# Patient Record
Sex: MALE | Race: WHITE | NOT HISPANIC OR LATINO | Employment: OTHER | RURAL
[De-identification: names, ages, dates, MRNs, and addresses within clinical notes are randomized per-mention and may not be internally consistent; named-entity substitution may affect disease eponyms.]

---

## 2018-01-03 ENCOUNTER — CONVERSION ENCOUNTER (OUTPATIENT)
Dept: FAMILY MEDICINE CLINIC | Facility: CLINIC | Age: 48
End: 2018-01-03

## 2018-01-03 LAB
ALBUMIN SERPL-MCNC: 4.1 G/DL (ref 3.6–5.1)
ALP SERPL-CCNC: 50 U/L (ref 40–115)
AST SERPL-CCNC: 33 U/L (ref 10–40)
BILIRUB SERPL-MCNC: 0.9 MG/DL (ref 0.2–1.2)
BUN SERPL-MCNC: 9 MG/DL (ref 7–25)
BUN/CREAT SERPL: 9 (CALC) (ref 6–22)
CALCIUM SERPL-MCNC: 9.8 MG/DL (ref 8.6–10.2)
CHLORIDE SERPL-SCNC: 102 MMOL/L (ref 98–110)
CHOLEST SERPL-MCNC: 177 MG/DL (ref 125–200)
CONV CO2: 28 MMOL/L (ref 21–33)
CONV TOTAL PROTEIN: 7.1 G/DL (ref 6.2–8.3)
CREAT UR-MCNC: 1 MG/DL (ref 0.78–1.34)
GLOBULIN UR ELPH-MCNC: 3 MG/DL (ref 2.1–3.7)
GLUCOSE UR QL: 98 MG/DL (ref 65–99)
HDLC SERPL-MCNC: 29 MG/DL
INSULIN SERPL-ACNC: 1.4 (CALC) (ref 1–2.1)
POTASSIUM SERPL-SCNC: 3.8 MMOL/L (ref 3.5–5.3)
SODIUM SERPL-SCNC: 139 MMOL/L (ref 135–146)
TRIGL SERPL-MCNC: 408 MG/DL

## 2018-02-13 ENCOUNTER — INPATIENT HOSPITAL (OUTPATIENT)
Dept: URBAN - METROPOLITAN AREA HOSPITAL 84 | Facility: HOSPITAL | Age: 48
End: 2018-02-13

## 2018-02-13 DIAGNOSIS — K85.90 ACUTE PANCREATITIS WITHOUT NECROSIS OR INFECTION, UNSPECIFIE: ICD-10-CM

## 2018-02-13 PROCEDURE — 99222 1ST HOSP IP/OBS MODERATE 55: CPT | Performed by: NURSE PRACTITIONER

## 2018-02-14 ENCOUNTER — INPATIENT HOSPITAL (OUTPATIENT)
Dept: URBAN - METROPOLITAN AREA HOSPITAL 84 | Facility: HOSPITAL | Age: 48
End: 2018-02-14

## 2018-02-14 DIAGNOSIS — K85.90 ACUTE PANCREATITIS WITHOUT NECROSIS OR INFECTION, UNSPECIFIE: ICD-10-CM

## 2018-02-14 PROCEDURE — 99232 SBSQ HOSP IP/OBS MODERATE 35: CPT | Performed by: NURSE PRACTITIONER

## 2018-02-15 ENCOUNTER — INPATIENT HOSPITAL (OUTPATIENT)
Dept: URBAN - METROPOLITAN AREA HOSPITAL 84 | Facility: HOSPITAL | Age: 48
End: 2018-02-15

## 2018-02-15 DIAGNOSIS — K85.90 ACUTE PANCREATITIS WITHOUT NECROSIS OR INFECTION, UNSPECIFIE: ICD-10-CM

## 2018-02-15 PROCEDURE — 99232 SBSQ HOSP IP/OBS MODERATE 35: CPT | Performed by: NURSE PRACTITIONER

## 2018-02-16 ENCOUNTER — INPATIENT HOSPITAL (OUTPATIENT)
Dept: URBAN - METROPOLITAN AREA HOSPITAL 84 | Facility: HOSPITAL | Age: 48
End: 2018-02-16

## 2018-02-16 DIAGNOSIS — K85.90 ACUTE PANCREATITIS WITHOUT NECROSIS OR INFECTION, UNSPECIFIE: ICD-10-CM

## 2018-02-16 PROCEDURE — 99232 SBSQ HOSP IP/OBS MODERATE 35: CPT | Performed by: NURSE PRACTITIONER

## 2018-02-17 ENCOUNTER — INPATIENT HOSPITAL (OUTPATIENT)
Dept: URBAN - METROPOLITAN AREA HOSPITAL 84 | Facility: HOSPITAL | Age: 48
End: 2018-02-17

## 2018-02-17 DIAGNOSIS — K85.90 ACUTE PANCREATITIS WITHOUT NECROSIS OR INFECTION, UNSPECIFIE: ICD-10-CM

## 2018-02-17 PROCEDURE — 99231 SBSQ HOSP IP/OBS SF/LOW 25: CPT | Performed by: NURSE PRACTITIONER

## 2019-07-17 DIAGNOSIS — E78.5 HYPERLIPIDEMIA, UNSPECIFIED HYPERLIPIDEMIA TYPE: Primary | ICD-10-CM

## 2019-07-17 DIAGNOSIS — I10 HYPERTENSION, UNSPECIFIED TYPE: ICD-10-CM

## 2019-07-19 LAB
ALBUMIN SERPL-MCNC: 4.8 G/DL (ref 3.5–5.5)
ALBUMIN/GLOB SERPL: 1.7 {RATIO} (ref 1.2–2.2)
ALP SERPL-CCNC: 74 IU/L (ref 39–117)
ALT SERPL-CCNC: 52 IU/L (ref 0–44)
AST SERPL-CCNC: 39 IU/L (ref 0–40)
BASOPHILS # BLD AUTO: 0 X10E3/UL (ref 0–0.2)
BASOPHILS NFR BLD AUTO: 0 %
BILIRUB SERPL-MCNC: 0.5 MG/DL (ref 0–1.2)
BUN SERPL-MCNC: 16 MG/DL (ref 6–24)
BUN/CREAT SERPL: 16 (ref 9–20)
CALCIUM SERPL-MCNC: 9.8 MG/DL (ref 8.7–10.2)
CHLORIDE SERPL-SCNC: 101 MMOL/L (ref 96–106)
CHOLEST SERPL-MCNC: 223 MG/DL (ref 100–199)
CHOLEST/HDLC SERPL: 7.7 RATIO (ref 0–5)
CO2 SERPL-SCNC: 24 MMOL/L (ref 20–29)
CREAT SERPL-MCNC: 0.98 MG/DL (ref 0.76–1.27)
EOSINOPHIL # BLD AUTO: 0.2 X10E3/UL (ref 0–0.4)
EOSINOPHIL NFR BLD AUTO: 2 %
ERYTHROCYTE [DISTWIDTH] IN BLOOD BY AUTOMATED COUNT: 13.6 % (ref 12.3–15.4)
GLOBULIN SER CALC-MCNC: 2.9 G/DL (ref 1.5–4.5)
GLUCOSE SERPL-MCNC: 94 MG/DL (ref 65–99)
HCT VFR BLD AUTO: 52 % (ref 37.5–51)
HDLC SERPL-MCNC: 29 MG/DL
HGB BLD-MCNC: 17.6 G/DL (ref 13–17.7)
IMM GRANULOCYTES # BLD AUTO: 0 X10E3/UL (ref 0–0.1)
IMM GRANULOCYTES NFR BLD AUTO: 0 %
LDLC SERPL CALC-MCNC: 118 MG/DL (ref 0–99)
LYMPHOCYTES # BLD AUTO: 3.4 X10E3/UL (ref 0.7–3.1)
LYMPHOCYTES NFR BLD AUTO: 37 %
MCH RBC QN AUTO: 29.8 PG (ref 26.6–33)
MCHC RBC AUTO-ENTMCNC: 33.8 G/DL (ref 31.5–35.7)
MCV RBC AUTO: 88 FL (ref 79–97)
MONOCYTES # BLD AUTO: 0.5 X10E3/UL (ref 0.1–0.9)
MONOCYTES NFR BLD AUTO: 6 %
NEUTROPHILS # BLD AUTO: 4.9 X10E3/UL (ref 1.4–7)
NEUTROPHILS NFR BLD AUTO: 55 %
PLATELET # BLD AUTO: 234 X10E3/UL (ref 150–450)
POTASSIUM SERPL-SCNC: 4.1 MMOL/L (ref 3.5–5.2)
PROT SERPL-MCNC: 7.7 G/DL (ref 6–8.5)
RBC # BLD AUTO: 5.9 X10E6/UL (ref 4.14–5.8)
SODIUM SERPL-SCNC: 142 MMOL/L (ref 134–144)
TRIGL SERPL-MCNC: 379 MG/DL (ref 0–149)
TSH SERPL DL<=0.005 MIU/L-ACNC: 3.54 UIU/ML (ref 0.45–4.5)
VLDLC SERPL CALC-MCNC: 76 MG/DL (ref 5–40)
WBC # BLD AUTO: 9.2 X10E3/UL (ref 3.4–10.8)

## 2019-07-23 PROBLEM — M10.9 GOUT: Status: ACTIVE | Noted: 2019-07-23

## 2019-07-23 PROBLEM — E78.5 HYPERLIPIDEMIA: Status: ACTIVE | Noted: 2019-07-23

## 2019-07-23 PROBLEM — I10 HYPERTENSION, BENIGN: Status: ACTIVE | Noted: 2019-07-23

## 2019-07-23 PROBLEM — J30.1 SEASONAL ALLERGIC RHINITIS DUE TO POLLEN: Status: ACTIVE | Noted: 2019-07-23

## 2019-07-23 PROBLEM — E66.9 OBESITY: Status: ACTIVE | Noted: 2019-07-23

## 2019-07-23 PROBLEM — Z00.01 ANNUAL VISIT FOR GENERAL ADULT MEDICAL EXAMINATION WITH ABNORMAL FINDINGS: Status: ACTIVE | Noted: 2019-07-23

## 2019-07-23 PROBLEM — F51.9 NONORGANIC SLEEP DISORDER: Status: ACTIVE | Noted: 2019-07-23

## 2019-07-23 PROBLEM — G47.33 OSA (OBSTRUCTIVE SLEEP APNEA): Status: ACTIVE | Noted: 2019-07-23

## 2019-07-23 PROBLEM — E66.3 OVERWEIGHT: Status: ACTIVE | Noted: 2019-07-23

## 2019-07-23 PROBLEM — R51.9 HEADACHE: Status: ACTIVE | Noted: 2019-07-23

## 2019-07-23 PROBLEM — Z13.1 SCREENING FOR DIABETES MELLITUS: Status: ACTIVE | Noted: 2019-07-23

## 2019-07-23 PROBLEM — F43.0 ACUTE CRISIS REACTION: Status: ACTIVE | Noted: 2019-07-23

## 2019-07-23 PROBLEM — R53.83 MALAISE AND FATIGUE: Status: ACTIVE | Noted: 2019-07-23

## 2019-07-23 PROBLEM — R53.81 MALAISE AND FATIGUE: Status: ACTIVE | Noted: 2019-07-23

## 2019-07-23 PROBLEM — R07.9 CHEST PAIN: Status: ACTIVE | Noted: 2019-07-23

## 2019-07-23 PROBLEM — K64.9 HEMORRHOIDS: Status: ACTIVE | Noted: 2019-07-23

## 2019-07-23 RX ORDER — FENOFIBRATE 145 MG/1
1 TABLET, COATED ORAL DAILY
COMMUNITY
Start: 2018-01-10 | End: 2019-07-24 | Stop reason: SDUPTHER

## 2019-07-23 RX ORDER — FLUTICASONE PROPIONATE 50 MCG
2 SPRAY, SUSPENSION (ML) NASAL DAILY
COMMUNITY
Start: 2018-01-10 | End: 2021-06-14 | Stop reason: SDUPTHER

## 2019-07-23 RX ORDER — HYDROCHLOROTHIAZIDE 25 MG/1
1 TABLET ORAL DAILY
COMMUNITY
Start: 2018-01-10 | End: 2019-07-24 | Stop reason: ALTCHOICE

## 2019-07-23 RX ORDER — ATORVASTATIN CALCIUM 10 MG/1
1 TABLET, FILM COATED ORAL EVERY OTHER DAY
COMMUNITY
Start: 2018-07-31 | End: 2019-07-24 | Stop reason: SDUPTHER

## 2019-07-23 RX ORDER — CETIRIZINE HYDROCHLORIDE 10 MG/1
TABLET ORAL DAILY
COMMUNITY
Start: 2018-01-10 | End: 2019-12-02

## 2019-07-23 RX ORDER — MONTELUKAST SODIUM 10 MG/1
1 TABLET ORAL DAILY
COMMUNITY
Start: 2018-01-10 | End: 2019-12-02

## 2019-07-23 RX ORDER — LISINOPRIL 40 MG/1
1 TABLET ORAL DAILY
COMMUNITY
Start: 2018-01-10 | End: 2019-07-24 | Stop reason: SDUPTHER

## 2019-07-23 RX ORDER — ALLOPURINOL 300 MG/1
1 TABLET ORAL DAILY
COMMUNITY
Start: 2018-01-10 | End: 2019-07-24 | Stop reason: SDUPTHER

## 2019-07-24 ENCOUNTER — OFFICE VISIT (OUTPATIENT)
Dept: FAMILY MEDICINE CLINIC | Facility: CLINIC | Age: 49
End: 2019-07-24

## 2019-07-24 VITALS
DIASTOLIC BLOOD PRESSURE: 130 MMHG | TEMPERATURE: 98.3 F | SYSTOLIC BLOOD PRESSURE: 190 MMHG | RESPIRATION RATE: 20 BRPM | OXYGEN SATURATION: 98 % | HEART RATE: 99 BPM | WEIGHT: 236.4 LBS | HEIGHT: 70 IN | BODY MASS INDEX: 33.84 KG/M2

## 2019-07-24 DIAGNOSIS — E78.5 HYPERLIPIDEMIA, UNSPECIFIED HYPERLIPIDEMIA TYPE: ICD-10-CM

## 2019-07-24 DIAGNOSIS — J30.1 SEASONAL ALLERGIC RHINITIS DUE TO POLLEN: ICD-10-CM

## 2019-07-24 DIAGNOSIS — G47.33 OSA (OBSTRUCTIVE SLEEP APNEA): ICD-10-CM

## 2019-07-24 DIAGNOSIS — I10 HYPERTENSION, BENIGN: ICD-10-CM

## 2019-07-24 DIAGNOSIS — M10.9 GOUT, UNSPECIFIED CAUSE, UNSPECIFIED CHRONICITY, UNSPECIFIED SITE: Primary | ICD-10-CM

## 2019-07-24 DIAGNOSIS — Z00.01 ANNUAL VISIT FOR GENERAL ADULT MEDICAL EXAMINATION WITH ABNORMAL FINDINGS: ICD-10-CM

## 2019-07-24 LAB
BILIRUB BLD-MCNC: NEGATIVE MG/DL
CLARITY, POC: CLEAR
COLOR UR: YELLOW
GLUCOSE UR STRIP-MCNC: NEGATIVE MG/DL
KETONES UR QL: NEGATIVE
LEUKOCYTE EST, POC: NEGATIVE
NITRITE UR-MCNC: NEGATIVE MG/ML
PH UR: 5 [PH] (ref 5–8)
PROT UR STRIP-MCNC: ABNORMAL MG/DL
RBC # UR STRIP: ABNORMAL /UL
SP GR UR: 1.01 (ref 1–1.03)
UROBILINOGEN UR QL: NORMAL

## 2019-07-24 PROCEDURE — 81003 URINALYSIS AUTO W/O SCOPE: CPT | Performed by: FAMILY MEDICINE

## 2019-07-24 PROCEDURE — 99396 PREV VISIT EST AGE 40-64: CPT | Performed by: FAMILY MEDICINE

## 2019-07-24 PROCEDURE — 99213 OFFICE O/P EST LOW 20 MIN: CPT | Performed by: FAMILY MEDICINE

## 2019-07-24 RX ORDER — FENOFIBRATE 145 MG/1
145 TABLET, COATED ORAL DAILY
Qty: 90 TABLET | Refills: 3 | Status: SHIPPED | OUTPATIENT
Start: 2019-07-24 | End: 2020-08-05

## 2019-07-24 RX ORDER — LISINOPRIL 40 MG/1
40 TABLET ORAL DAILY
Qty: 90 TABLET | Refills: 3 | Status: SHIPPED | OUTPATIENT
Start: 2019-07-24 | End: 2020-08-05

## 2019-07-24 RX ORDER — ATORVASTATIN CALCIUM 10 MG/1
10 TABLET, FILM COATED ORAL DAILY
Qty: 90 TABLET | Refills: 3 | Status: SHIPPED | OUTPATIENT
Start: 2019-07-24 | End: 2020-08-05

## 2019-07-24 RX ORDER — ALLOPURINOL 300 MG/1
300 TABLET ORAL DAILY
Qty: 90 TABLET | Refills: 3 | Status: SHIPPED | OUTPATIENT
Start: 2019-07-24 | End: 2020-08-05

## 2019-07-24 RX ORDER — GLUCOSAMINE HCL 500 MG
1 TABLET ORAL
COMMUNITY

## 2019-07-24 NOTE — PROGRESS NOTES
Bethanie Sam is a 49 y.o. male.     Chief Complaint   Patient presents with   • Annual Exam     last physical 10/4/2017       The patient is here: for coordination of medical care.  Last comprehensive physical was on 18 mos ago.  Previous physical was performed by  Dr. Tubbs  .  Overall has: moderate activity with work/home activities. Nutrition: inappropriate diet. Last tetanus shot was unknown. .    History of Present Illness     Recent Hospitalizations:  No  [facility]      I personally reviewed and updated the patient's allergies, medications, problem list, and past medical, surgical, social, and family history.     Family History   Problem Relation Age of Onset   • Heart disease Mother    • Hypertension Mother    • Stroke Mother    • Heart disease Father    • Lung cancer Paternal Uncle    • Gout Maternal Grandmother    • Hearing loss Maternal Grandfather    • Hypertension Maternal Grandfather        Social History     Tobacco Use   • Smoking status: Never Smoker   Substance Use Topics   • Alcohol use: No     Frequency: Never   • Drug use: No       History reviewed. No pertinent surgical history.    Patient Active Problem List   Diagnosis   • Acute crisis reaction   • Chest pain   • Gout   • Headache   • Hemorrhoids   • Hyperlipidemia   • Hypertension, benign   • Malaise and fatigue   • Nonorganic sleep disorder   • Obesity   • VERONICA (obstructive sleep apnea)   • Overweight   • Annual visit for general adult medical examination with abnormal findings   • Screening for diabetes mellitus   • Seasonal allergic rhinitis due to pollen         Current Outpatient Medications:   •  Cholecalciferol (VITAMIN D3) 3000 units tablet, Take 1 tablet by mouth., Disp: , Rfl:   •  allopurinol (ZYLOPRIM) 300 MG tablet, Take 1 tablet by mouth Daily., Disp: 90 tablet, Rfl: 3  •  atorvastatin (LIPITOR) 10 MG tablet, Take 1 tablet by mouth Daily., Disp: 90 tablet, Rfl: 3  •  cetirizine (ZYRTEC ALLERGY) 10 MG tablet, Take   "by mouth Daily., Disp: , Rfl:   •  fenofibrate (TRICOR) 145 MG tablet, Take 1 tablet by mouth Daily., Disp: 90 tablet, Rfl: 3  •  fluticasone (FLONASE) 50 MCG/ACT nasal spray, 2 sprays into the nostril(s) as directed by provider Daily., Disp: , Rfl:   •  lisinopril (PRINIVIL,ZESTRIL) 40 MG tablet, Take 1 tablet by mouth Daily., Disp: 90 tablet, Rfl: 3  •  montelukast (SINGULAIR) 10 MG tablet, Take 1 tablet by mouth Daily., Disp: , Rfl:          Review of Systems   Constitutional: Negative for chills and diaphoresis.   HENT: Negative for trouble swallowing and voice change.    Eyes: Negative for visual disturbance.   Respiratory: Negative for shortness of breath.    Cardiovascular: Negative for chest pain and palpitations.   Gastrointestinal: Negative for abdominal pain and nausea.   Endocrine: Negative for polydipsia and polyphagia.   Genitourinary: Negative for hematuria.   Musculoskeletal: Negative for neck stiffness.   Skin: Negative for color change and pallor.   Allergic/Immunologic: Negative for immunocompromised state.   Neurological: Negative for seizures and syncope.   Hematological: Negative for adenopathy.   Psychiatric/Behavioral: Negative for sleep disturbance and suicidal ideas.       Objective   BP (!) 190/130   Pulse 99   Temp 98.3 °F (36.8 °C) (Oral)   Resp 20   Ht 176.5 cm (69.5\")   Wt 107 kg (236 lb 6.4 oz)   SpO2 98%   BMI 34.41 kg/m²   Wt Readings from Last 3 Encounters:   07/24/19 107 kg (236 lb 6.4 oz)   01/10/18 103 kg (227 lb 12.8 oz)   10/04/17 101 kg (221 lb 12.8 oz)     Physical Exam   Constitutional: He is oriented to person, place, and time. He appears well-developed and well-nourished.   HENT:   Head: Normocephalic.   Right Ear: Tympanic membrane, external ear and ear canal normal.   Left Ear: Tympanic membrane, external ear and ear canal normal.   Nose: Nose normal.   Eyes: Conjunctivae, EOM and lids are normal. Pupils are equal, round, and reactive to light.   Neck: No JVD " present. Carotid bruit is not present. No tracheal deviation present. No thyromegaly present.   Cardiovascular: Normal rate, regular rhythm, normal heart sounds and intact distal pulses. Exam reveals no gallop and no friction rub.   No murmur heard.  Pulmonary/Chest: Effort normal and breath sounds normal. No stridor. He has no decreased breath sounds. He has no wheezes. He has no rales.   Abdominal: Soft. Bowel sounds are normal. He exhibits no distension and no mass. There is no tenderness. There is no rebound and no guarding. No hernia.   Lymphadenopathy:        Head (right side): No submental, no submandibular, no tonsillar, no preauricular, no posterior auricular and no occipital adenopathy present.        Head (left side): No submental, no submandibular, no tonsillar, no preauricular, no posterior auricular and no occipital adenopathy present.     He has no cervical adenopathy.   Neurological: He is alert and oriented to person, place, and time. He has normal strength and normal reflexes. No cranial nerve deficit or sensory deficit. Coordination and gait normal.   Skin: Skin is warm and dry. Turgor is normal. He is not diaphoretic. No pallor.       Recent Lab Results:    Lab Results   Component Value Date    GLU 94 07/18/2019        Lab Results   Component Value Date    CHOL 211 (H) 02/12/2018    TRIG 379 (H) 07/18/2019    HDL 29 (L) 07/18/2019    VLDL 76 (H) 07/18/2019    LDLHDL 5.0 02/12/2018     LDL Cholesterol    Date Value Ref Range Status   07/18/2019 118 (H) 0 - 99 mg/dL Final   02/12/2018 128 (H) 0 - 100 mg/dL Final     No results found for: PSA  Lab Results   Component Value Date    WBC 9.2 07/18/2019    HGB 17.6 07/18/2019    HCT 52.0 (H) 07/18/2019    MCV 88 07/18/2019     07/18/2019     Lab Results   Component Value Date    TSH 3.540 07/18/2019     Lab Results   Component Value Date    GLUCOSE 95 02/17/2018    BUN 16 07/18/2019    CREATININE 0.98 07/18/2019    EGFRIFNONA 90 07/18/2019     EGFRIFAFRI 104 07/18/2019    BCR 16 07/18/2019    K 4.1 07/18/2019    CO2 24 07/18/2019    CALCIUM 9.8 07/18/2019    PROTENTOTREF 7.7 07/18/2019    ALBUMIN 4.8 07/18/2019    LABIL2 1.7 07/18/2019    AST 39 07/18/2019    ALT 52 (H) 07/18/2019         Age-appropriate Screening Schedule:  Refer to the list below for future screening recommendations based on patient's age, sex and/or medical conditions. Orders for these recommended tests are listed in the plan section. The patient has been provided with a written plan.    Health Maintenance   Topic Date Due   • TDAP/TD VACCINES (1 - Tdap) 01/02/1989   • INFLUENZA VACCINE  08/01/2019   • LIPID PANEL  07/18/2020           Assessment/Plan   Problem List Items Addressed This Visit        Cardiovascular and Mediastinum    Hyperlipidemia    Overview     Much improved on atorvastatin  Discussed diet, exercise, lifestyle modification.   continue fibrate  risk of significant morbidity from untreated hign cholesterol discussed, he voices understanding  Follow up recheck bloodwork  he agrees to call or come back in for for a visit if he cannot tolerate his cholesterol medicine daily         Relevant Medications    fenofibrate (TRICOR) 145 MG tablet    atorvastatin (LIPITOR) 10 MG tablet    Hypertension, benign    Overview     good control  Discussed low sodium diet, lifestyle modification.   he stopped asa, restart         Relevant Medications    lisinopril (PRINIVIL,ZESTRIL) 40 MG tablet       Respiratory    VERONICA (obstructive sleep apnea)    Overview     recommend repoeat titration he declines 2nd cost         Seasonal allergic rhinitis due to pollen    Relevant Medications    cetirizine (ZYRTEC ALLERGY) 10 MG tablet    fluticasone (FLONASE) 50 MCG/ACT nasal spray    montelukast (SINGULAIR) 10 MG tablet       Other    Gout - Primary    Overview     improved on  Allopurinol, restart  Follow up recheck uric acid level on rx  consider stop hctz  Discussed diet, lifestyle  modification.         Relevant Medications    allopurinol (ZYLOPRIM) 300 MG tablet    Other Relevant Orders    Uric Acid    Annual visit for general adult medical examination with abnormal findings    Overview     doing well, vaccines current  Age specific anticipatory guidance and warning signs discussed.  Diet, exercise, and lifestyle modification discussed.  Safety, seatbelts, and routine screening examinations discussed.  Discussed self-examinations.         Relevant Orders    POC Urinalysis Dipstick, Automated (Completed)              Expected course, medications, and adverse effects discussed.  Call or return if worsening or persistent symptoms.

## 2019-07-25 LAB — URATE SERPL-MCNC: 9.3 MG/DL (ref 3.7–8.6)

## 2019-07-29 ENCOUNTER — TELEPHONE (OUTPATIENT)
Dept: FAMILY MEDICINE CLINIC | Facility: CLINIC | Age: 49
End: 2019-07-29

## 2019-07-29 NOTE — TELEPHONE ENCOUNTER
Called and spoke to patient and let him know.          ----- Message from Reyes Tubbs MD sent at 7/29/2019  7:22 AM EDT -----  Let him know his uric acid level is high at 9.3, goal is less than 6, want to restart allopurinol as planned

## 2019-07-29 NOTE — PROGRESS NOTES
Let him know his uric acid level is high at 9.3, goal is less than 6, want to restart allopurinol as planned

## 2019-08-13 DIAGNOSIS — I10 HYPERTENSION, BENIGN: Primary | ICD-10-CM

## 2019-08-13 RX ORDER — METOPROLOL SUCCINATE 25 MG/1
25 TABLET, EXTENDED RELEASE ORAL DAILY
Qty: 30 TABLET | Refills: 2 | Status: SHIPPED | OUTPATIENT
Start: 2019-08-13 | End: 2019-12-02 | Stop reason: SDUPTHER

## 2019-08-13 NOTE — TELEPHONE ENCOUNTER
Called pt and spoke with him and he said that he is taking Lisinopril 40mg daily.  Dr. Tubbs was notified and he said that would restart the Toprol XL 25mg daily.  I sent request to him.

## 2019-08-13 NOTE — TELEPHONE ENCOUNTER
STATES BP IS RUNNING SYSTOLIC OVER 180  -  DYSTOLIC  OVER 100 -    HAS BEEN ON NEW BP MED FOR SEVERAL WEEKS - DOESN'T SEEM TO BE COMING DOWN MUCH -    STATES CHECKS BP DAILY   - PLEASE CALL TO ADVISE

## 2019-08-19 ENCOUNTER — TELEPHONE (OUTPATIENT)
Dept: FAMILY MEDICINE CLINIC | Facility: CLINIC | Age: 49
End: 2019-08-19

## 2019-08-19 NOTE — TELEPHONE ENCOUNTER
his blood pressure was running high today we will call back to check on him, is it still running up? find out how it is looking, thanks

## 2019-08-19 NOTE — TELEPHONE ENCOUNTER
PT SAID THAT HE HAD ANOTHER MEDICATION SENT IN FOR HIM BUT HIS BLOOD PRESSURE IS RARLEY EVERY OUT OF SORT. HE WANTS TO KNOW WHY THIS WAS DONE

## 2019-08-20 ENCOUNTER — TELEPHONE (OUTPATIENT)
Dept: FAMILY MEDICINE CLINIC | Facility: CLINIC | Age: 49
End: 2019-08-20

## 2019-08-20 NOTE — TELEPHONE ENCOUNTER
Spoke with pt and he said his BP is still running high.  His blood pressure was 170/100 something today and he said the systolic is never under 170 and when it goes to 190 and he has headaches.   He said he is taking the new medicine you prescribed.

## 2019-09-18 ENCOUNTER — TELEPHONE (OUTPATIENT)
Dept: FAMILY MEDICINE CLINIC | Facility: CLINIC | Age: 49
End: 2019-09-18

## 2019-09-18 NOTE — TELEPHONE ENCOUNTER
Pt blood pressure medication is not working properly would like a different medication sent to [facility]. BP is running 180/100.

## 2019-09-20 DIAGNOSIS — I10 HYPERTENSION, BENIGN: Primary | ICD-10-CM

## 2019-09-20 RX ORDER — METOPROLOL TARTRATE 50 MG/1
50 TABLET, FILM COATED ORAL 2 TIMES DAILY
Qty: 60 TABLET | Refills: 12 | Status: SHIPPED | OUTPATIENT
Start: 2019-09-20 | End: 2019-12-02

## 2019-09-20 NOTE — TELEPHONE ENCOUNTER
Yes, let’s increase his metoprolol to 50 mg a day, OK to send me a request, let’s schedule him it back in here in three or four weeks to reassess him on the higher dose, thanks

## 2019-09-20 NOTE — TELEPHONE ENCOUNTER
Regarding: Prescription Question  Contact: [phone]  ----- Message from Mychart, Generic sent at 9/18/2019  9:43 PM EDT -----    Left two messages about Metoprolol ready to refill, but BP still 180's/100's, but didn't receive a reply.  I'm now out of Metoprolol, but don't want spend the money for refill, if it is going to be changed.  Do I need to schedule an visit?

## 2019-09-23 ENCOUNTER — TELEPHONE (OUTPATIENT)
Dept: FAMILY MEDICINE CLINIC | Facility: CLINIC | Age: 49
End: 2019-09-23

## 2019-09-23 NOTE — TELEPHONE ENCOUNTER
Regarding: RE: Prescription Question  Contact: [phone]  ----- Message from Mychart, Generic sent at 9/20/2019  3:10 PM EDT -----    Has the new prescription been sent to [facility] [place]?  ----- Message -----  From: TANYA HERNANDEZ  Sent: 9/20/2019  3:04 PM EDT  To: Francesco Sam  Subject: RE: Prescription Question  Spoke with Dr. Tubbs and he said Yes, let's increase his metoprolol to 50 mg a day, OK to send me a request, let's schedule him it back in here in three or four weeks to reassess him on the higher dose    ----- Message -----     From: Francesco Sam     Sent: 9/18/2019  9:43 PM EDT       To: Reyes Tubbs MD  Subject: Prescription Question    Left two messages about Metoprolol ready to refill, but BP still 180's/100's, but didn't receive a reply.  I'm now out of Metoprolol, but don't want spend the money for refill, if it is going to be changed.  Do I need to schedule an visit?

## 2019-11-22 ENCOUNTER — TELEPHONE (OUTPATIENT)
Dept: FAMILY MEDICINE CLINIC | Facility: CLINIC | Age: 49
End: 2019-11-22

## 2019-11-22 NOTE — TELEPHONE ENCOUNTER
Regarding: Prescription Question  Contact: [phone]  ----- Message from Mychart, Generic sent at 11/21/2019  1:28 AM EST -----    I have been on the increased dosage and frequency of metoprolol approximately 6 weeks, and my BP is still 180's/100's.  Should I have seen a decrease by now?

## 2019-11-23 ENCOUNTER — TELEPHONE (OUTPATIENT)
Dept: FAMILY MEDICINE CLINIC | Facility: CLINIC | Age: 49
End: 2019-11-23

## 2019-11-23 ENCOUNTER — PATIENT MESSAGE (OUTPATIENT)
Dept: FAMILY MEDICINE CLINIC | Facility: CLINIC | Age: 49
End: 2019-11-23

## 2019-12-02 ENCOUNTER — OFFICE VISIT (OUTPATIENT)
Dept: FAMILY MEDICINE CLINIC | Facility: CLINIC | Age: 49
End: 2019-12-02

## 2019-12-02 VITALS
HEART RATE: 85 BPM | TEMPERATURE: 98.6 F | OXYGEN SATURATION: 98 % | DIASTOLIC BLOOD PRESSURE: 120 MMHG | WEIGHT: 239.6 LBS | RESPIRATION RATE: 18 BRPM | HEIGHT: 70 IN | BODY MASS INDEX: 34.3 KG/M2 | SYSTOLIC BLOOD PRESSURE: 180 MMHG

## 2019-12-02 DIAGNOSIS — F43.0 ACUTE CRISIS REACTION: ICD-10-CM

## 2019-12-02 DIAGNOSIS — M10.9 GOUT, UNSPECIFIED CAUSE, UNSPECIFIED CHRONICITY, UNSPECIFIED SITE: ICD-10-CM

## 2019-12-02 DIAGNOSIS — E66.3 OVERWEIGHT: ICD-10-CM

## 2019-12-02 DIAGNOSIS — I10 HYPERTENSION, BENIGN: Primary | ICD-10-CM

## 2019-12-02 DIAGNOSIS — E78.5 HYPERLIPIDEMIA, UNSPECIFIED HYPERLIPIDEMIA TYPE: ICD-10-CM

## 2019-12-02 DIAGNOSIS — G47.33 OSA (OBSTRUCTIVE SLEEP APNEA): ICD-10-CM

## 2019-12-02 PROCEDURE — 99213 OFFICE O/P EST LOW 20 MIN: CPT | Performed by: FAMILY MEDICINE

## 2019-12-02 RX ORDER — ASPIRIN 81 MG/1
81 TABLET ORAL DAILY
COMMUNITY

## 2019-12-02 RX ORDER — HYDROCHLOROTHIAZIDE 25 MG/1
25 TABLET ORAL DAILY
Qty: 30 TABLET | Refills: 2 | Status: SHIPPED | OUTPATIENT
Start: 2019-12-02 | End: 2020-03-03

## 2019-12-02 RX ORDER — SERTRALINE HYDROCHLORIDE 25 MG/1
25 TABLET, FILM COATED ORAL DAILY
Qty: 30 TABLET | Refills: 2 | Status: SHIPPED | OUTPATIENT
Start: 2019-12-02 | End: 2020-02-03 | Stop reason: SDUPTHER

## 2019-12-02 RX ORDER — METOPROLOL SUCCINATE 100 MG/1
100 TABLET, EXTENDED RELEASE ORAL DAILY
Qty: 30 TABLET | Refills: 2 | Status: SHIPPED | OUTPATIENT
Start: 2019-12-02 | End: 2020-03-03

## 2019-12-02 NOTE — PROGRESS NOTES
Bethanie Sam is a 49 y.o. male.     Chief Complaint   Patient presents with   • Hypertension       Hypertension   This is a chronic problem. The current episode started more than 1 year ago. The problem has been gradually worsening since onset. The problem is uncontrolled. Associated symptoms include headaches, malaise/fatigue, orthopnea, palpitations, PND, shortness of breath and sweats. Pertinent negatives include no blurred vision, chest pain, neck pain or peripheral edema. Agents associated with hypertension include NSAIDs. Risk factors for coronary artery disease include dyslipidemia, family history, obesity, sedentary lifestyle and stress. Current antihypertension treatment includes ACE inhibitors and beta blockers. There are no compliance problems.             I personally reviewed and updated the patient's allergies, medications, problem list, and past medical, surgical, social, and family history.     Family History   Problem Relation Age of Onset   • Heart disease Mother    • Hypertension Mother    • Stroke Mother    • Heart disease Father    • Lung cancer Paternal Uncle    • Gout Maternal Grandmother    • Hearing loss Maternal Grandfather    • Hypertension Maternal Grandfather        Social History     Tobacco Use   • Smoking status: Never Smoker   • Smokeless tobacco: Never Used   Substance Use Topics   • Alcohol use: No     Frequency: Never   • Drug use: No       History reviewed. No pertinent surgical history.    Patient Active Problem List   Diagnosis   • Acute crisis reaction   • Chest pain   • Gout   • Headache   • Hemorrhoids   • Hyperlipidemia   • Hypertension, benign   • Malaise and fatigue   • Nonorganic sleep disorder   • Obesity   • VERONICA (obstructive sleep apnea)   • Overweight   • Annual visit for general adult medical examination with abnormal findings   • Screening for diabetes mellitus   • Seasonal allergic rhinitis due to pollen         Current Outpatient Medications:   •   allopurinol (ZYLOPRIM) 300 MG tablet, Take 1 tablet by mouth Daily., Disp: 90 tablet, Rfl: 3  •  aspirin 81 MG EC tablet, Take 81 mg by mouth Daily., Disp: , Rfl:   •  atorvastatin (LIPITOR) 10 MG tablet, Take 1 tablet by mouth Daily., Disp: 90 tablet, Rfl: 3  •  Cholecalciferol (VITAMIN D3) 3000 units tablet, Take 1 tablet by mouth., Disp: , Rfl:   •  fenofibrate (TRICOR) 145 MG tablet, Take 1 tablet by mouth Daily., Disp: 90 tablet, Rfl: 3  •  fluticasone (FLONASE) 50 MCG/ACT nasal spray, 2 sprays into the nostril(s) as directed by provider Daily., Disp: , Rfl:   •  lisinopril (PRINIVIL,ZESTRIL) 40 MG tablet, Take 1 tablet by mouth Daily., Disp: 90 tablet, Rfl: 3  •  NON FORMULARY, TESTboost has hawthorn berry, ginseng root, ashwagandha root, tribulus terrestris fruit, Disp: , Rfl:   •  Omega-3 Fatty Acids (FISH OIL OMEGA-3 PO), Take 1,400 mg by mouth., Disp: , Rfl:   •  hydroCHLOROthiazide (HYDRODIURIL) 25 MG tablet, Take 1 tablet by mouth Daily., Disp: 30 tablet, Rfl: 2  •  metoprolol succinate XL (TOPROL-XL) 100 MG 24 hr tablet, Take 1 tablet by mouth Daily., Disp: 30 tablet, Rfl: 2  •  sertraline (ZOLOFT) 25 MG tablet, Take 1 tablet by mouth Daily., Disp: 30 tablet, Rfl: 2         Review of Systems   Constitutional: Positive for malaise/fatigue. Negative for chills and diaphoresis.   Eyes: Negative for blurred vision and visual disturbance.   Respiratory: Positive for shortness of breath.    Cardiovascular: Positive for palpitations, orthopnea and PND. Negative for chest pain.   Gastrointestinal: Negative for abdominal pain and nausea.   Endocrine: Negative for polydipsia and polyphagia.   Musculoskeletal: Negative for neck pain and neck stiffness.   Skin: Negative for color change and pallor.   Neurological: Negative for seizures and syncope.   Hematological: Negative for adenopathy.       Objective   BP (!) 180/120 (BP Location: Left arm, Patient Position: Sitting, Cuff Size: Adult)   Pulse 85   Temp 98.6  "°F (37 °C) (Oral)   Resp 18   Ht 176.5 cm (69.5\")   Wt 109 kg (239 lb 9.6 oz)   SpO2 98%   BMI 34.88 kg/m²   Wt Readings from Last 3 Encounters:   12/02/19 109 kg (239 lb 9.6 oz)   07/24/19 107 kg (236 lb 6.4 oz)   01/10/18 103 kg (227 lb 12.8 oz)     Physical Exam   Constitutional: He is oriented to person, place, and time. He appears well-developed and well-nourished.   Cardiovascular: Normal rate, regular rhythm, S1 normal, S2 normal, normal heart sounds, intact distal pulses and normal pulses. Exam reveals no gallop and no friction rub.   No murmur heard.  Pulmonary/Chest: Effort normal and breath sounds normal. No accessory muscle usage or stridor. He has no decreased breath sounds. He has no wheezes. He has no rhonchi. He has no rales.   Abdominal: Soft. Normal appearance, normal aorta and bowel sounds are normal. He exhibits no distension, no pulsatile midline mass and no mass. There is no hepatosplenomegaly. There is no tenderness. There is no rigidity, no rebound, no guarding, no CVA tenderness and negative Herr's sign. No hernia.   Neurological: He is alert and oriented to person, place, and time. He has normal strength and normal reflexes. He displays no tremor. No cranial nerve deficit or sensory deficit. He exhibits normal muscle tone. He displays no seizure activity. Coordination and gait normal.   Skin: Skin is warm and dry. Turgor is normal. He is not diaphoretic. No pallor.         Assessment/Plan       Hypertension.  Persistent poor control, he has not followed up.  Long discussion today, risk of significant morbidity from uncontrolled hypertension discussed, importance of close follow-up discussed, Rx increased.  Will check blood pressure over phone 1 time in 1 month and make adjustment, follow-up in office in 2 months.  Discussed low-sodium diet.  Cardiac stress testing planned age 50.  Call or return if worsening symptoms.  Hyperlipidemia.  Much improved on atorvastatin.  History of " triglycerides greater than 1000.  Continue fiber 8.  Discussed diet, exercise, lifestyle modification.  Follow-up recheck  VERONICA.  Recommend repeat sleep titration, he declined secondary to cost.  Gout.  Improved on allopurinol.  Follow-up recheck uric acid.  Depression.  He did not start sertraline, start now    Problem List Items Addressed This Visit        Cardiovascular and Mediastinum    Hyperlipidemia    Hypertension, benign - Primary    Relevant Medications    metoprolol succinate XL (TOPROL-XL) 100 MG 24 hr tablet    hydroCHLOROthiazide (HYDRODIURIL) 25 MG tablet       Respiratory    VERONICA (obstructive sleep apnea)       Other    Acute crisis reaction    Relevant Medications    sertraline (ZOLOFT) 25 MG tablet    Gout    Overweight              Expected course, medications, and adverse effects discussed.  Call or return if worsening or persistent symptoms.

## 2020-01-21 ENCOUNTER — TELEPHONE (OUTPATIENT)
Dept: FAMILY MEDICINE CLINIC | Facility: CLINIC | Age: 50
End: 2020-01-21

## 2020-01-21 NOTE — TELEPHONE ENCOUNTER
Pt called and said that his blood pressure has been running in 150/80's and he is feeling better but he has been sick for 1 month.  But not sick enough to do anything.

## 2020-02-03 ENCOUNTER — OFFICE VISIT (OUTPATIENT)
Dept: FAMILY MEDICINE CLINIC | Facility: CLINIC | Age: 50
End: 2020-02-03

## 2020-02-03 VITALS
HEIGHT: 70 IN | OXYGEN SATURATION: 98 % | SYSTOLIC BLOOD PRESSURE: 130 MMHG | WEIGHT: 239 LBS | RESPIRATION RATE: 18 BRPM | HEART RATE: 97 BPM | DIASTOLIC BLOOD PRESSURE: 80 MMHG | BODY MASS INDEX: 34.22 KG/M2 | TEMPERATURE: 98.4 F

## 2020-02-03 DIAGNOSIS — I10 HYPERTENSION, BENIGN: Primary | ICD-10-CM

## 2020-02-03 DIAGNOSIS — F43.0 ACUTE CRISIS REACTION: ICD-10-CM

## 2020-02-03 DIAGNOSIS — E78.01 FAMILIAL HYPERCHOLESTEROLEMIA: ICD-10-CM

## 2020-02-03 DIAGNOSIS — E66.3 OVERWEIGHT: ICD-10-CM

## 2020-02-03 PROCEDURE — 99213 OFFICE O/P EST LOW 20 MIN: CPT | Performed by: FAMILY MEDICINE

## 2020-02-03 NOTE — PROGRESS NOTES
Bethanie Sam is a 50 y.o. male.     Chief Complaint   Patient presents with   • Hypertension       Hypertension   This is a chronic problem. The current episode started more than 1 year ago. The problem has been gradually worsening since onset. The problem is uncontrolled. Associated symptoms include headaches, malaise/fatigue, orthopnea, PND and sweats. Pertinent negatives include no blurred vision, chest pain, neck pain or peripheral edema. Agents associated with hypertension include NSAIDs. Risk factors for coronary artery disease include dyslipidemia, family history, obesity, sedentary lifestyle and stress. Current antihypertension treatment includes ACE inhibitors and beta blockers. There are no compliance problems.             I personally reviewed and updated the patient's allergies, medications, problem list, and past medical, surgical, social, and family history.     Family History   Problem Relation Age of Onset   • Heart disease Mother    • Hypertension Mother    • Stroke Mother    • Heart disease Father    • Lung cancer Paternal Uncle    • Gout Maternal Grandmother    • Hearing loss Maternal Grandfather    • Hypertension Maternal Grandfather        Social History     Tobacco Use   • Smoking status: Never Smoker   • Smokeless tobacco: Never Used   Substance Use Topics   • Alcohol use: No     Frequency: Never   • Drug use: No       History reviewed. No pertinent surgical history.    Patient Active Problem List   Diagnosis   • Acute crisis reaction   • Chest pain   • Gout   • Headache   • Hemorrhoids   • Familial hypercholesterolemia   • Hypertension, benign   • Malaise and fatigue   • Nonorganic sleep disorder   • Obesity   • VERONICA (obstructive sleep apnea)   • Overweight   • Annual visit for general adult medical examination with abnormal findings   • Screening for diabetes mellitus   • Seasonal allergic rhinitis due to pollen         Current Outpatient Medications:   •  allopurinol  (ZYLOPRIM) 300 MG tablet, Take 1 tablet by mouth Daily., Disp: 90 tablet, Rfl: 3  •  aspirin 81 MG EC tablet, Take 81 mg by mouth Daily., Disp: , Rfl:   •  atorvastatin (LIPITOR) 10 MG tablet, Take 1 tablet by mouth Daily., Disp: 90 tablet, Rfl: 3  •  Cholecalciferol (VITAMIN D3) 3000 units tablet, Take 1 tablet by mouth., Disp: , Rfl:   •  fenofibrate (TRICOR) 145 MG tablet, Take 1 tablet by mouth Daily., Disp: 90 tablet, Rfl: 3  •  hydroCHLOROthiazide (HYDRODIURIL) 25 MG tablet, Take 1 tablet by mouth Daily., Disp: 30 tablet, Rfl: 2  •  lisinopril (PRINIVIL,ZESTRIL) 40 MG tablet, Take 1 tablet by mouth Daily., Disp: 90 tablet, Rfl: 3  •  metoprolol succinate XL (TOPROL-XL) 100 MG 24 hr tablet, Take 1 tablet by mouth Daily., Disp: 30 tablet, Rfl: 2  •  NON FORMULARY, TESTboost has hawthorn berry, ginseng root, ashwagandha root, tribulus terrestris fruit, Disp: , Rfl:   •  sertraline (ZOLOFT) 50 MG tablet, Take 1 tablet by mouth Daily., Disp: 30 tablet, Rfl: 11  •  fluticasone (FLONASE) 50 MCG/ACT nasal spray, 2 sprays into the nostril(s) as directed by provider Daily., Disp: , Rfl:   •  Omega-3 Fatty Acids (FISH OIL OMEGA-3 PO), Take 1,400 mg by mouth., Disp: , Rfl:          Review of Systems   Constitutional: Positive for malaise/fatigue. Negative for chills and diaphoresis.   Eyes: Negative for blurred vision and visual disturbance.   Cardiovascular: Positive for orthopnea and PND. Negative for chest pain.   Gastrointestinal: Negative for abdominal pain and nausea.   Endocrine: Negative for polydipsia and polyphagia.   Musculoskeletal: Negative for neck pain and neck stiffness.   Skin: Negative for color change and pallor.   Neurological: Negative for seizures and syncope.   Hematological: Negative for adenopathy.   Psychiatric/Behavioral: Negative for hallucinations and suicidal ideas.       Objective   /80 (BP Location: Left arm, Patient Position: Sitting, Cuff Size: Adult)   Pulse 97   Temp 98.4 °F  "(36.9 °C)   Resp 18   Ht 176.5 cm (69.5\")   Wt 108 kg (239 lb)   SpO2 98%   BMI 34.79 kg/m²   Wt Readings from Last 3 Encounters:   02/03/20 108 kg (239 lb)   12/02/19 109 kg (239 lb 9.6 oz)   07/24/19 107 kg (236 lb 6.4 oz)     Physical Exam   Constitutional: He is oriented to person, place, and time. He appears well-developed and well-nourished.   Cardiovascular: Normal rate, regular rhythm, S1 normal, S2 normal, normal heart sounds, intact distal pulses and normal pulses. Exam reveals no gallop and no friction rub.   No murmur heard.  Pulmonary/Chest: Effort normal and breath sounds normal. No accessory muscle usage or stridor. He has no decreased breath sounds. He has no wheezes. He has no rhonchi. He has no rales.   Abdominal: Soft. Normal appearance, normal aorta and bowel sounds are normal. He exhibits no distension, no pulsatile midline mass and no mass. There is no hepatosplenomegaly. There is no tenderness. There is no rigidity, no rebound, no guarding, no CVA tenderness and negative Herr's sign. No hernia.   Neurological: He is alert and oriented to person, place, and time. Coordination and gait normal.   Skin: Skin is warm and dry. Turgor is normal. He is not diaphoretic. No pallor.         Assessment/Plan     Hypertension.      Much improved today on Rx.  Discussed low-sodium diet.  Cardiac stress testing planned next year.  Call or return if worsening symptoms.  Hyperlipidemia.  Much improved on atorvastatin, TriCor.  Restart fish oil. History of triglycerides greater than 1000.  Discussed diet, exercise, lifestyle modification.  Follow-up recheck  VERONICA.  Recommend repeat sleep titration, he declined secondary to cost.  Gout.  Improved on allopurinol.  Follow-up recheck uric acid.  Depression.    Improved on sertraline, dose increased.  Fatty liver.  Stable, mild elevation LFTs, continue fish oil, continue to monitor.  Elevated fasting blood sugar.  117/borderline.  Discussed diet, exercise, " lifestyle modification.  Follow-up recheck in 1 year.    Problem List Items Addressed This Visit        Unprioritized    Acute crisis reaction    Relevant Medications    sertraline (ZOLOFT) 50 MG tablet    Familial hypercholesterolemia    Hypertension, benign - Primary    Overweight              Expected course, medications, and adverse effects discussed.  Call or return if worsening or persistent symptoms.

## 2020-02-05 PROBLEM — E78.01 FAMILIAL HYPERCHOLESTEROLEMIA: Status: ACTIVE | Noted: 2019-07-23

## 2020-02-29 DIAGNOSIS — I10 HYPERTENSION, BENIGN: ICD-10-CM

## 2020-03-03 RX ORDER — METOPROLOL SUCCINATE 100 MG/1
TABLET, EXTENDED RELEASE ORAL
Qty: 30 TABLET | Refills: 2 | Status: SHIPPED | OUTPATIENT
Start: 2020-03-03 | End: 2020-06-01

## 2020-03-03 RX ORDER — HYDROCHLOROTHIAZIDE 25 MG/1
TABLET ORAL
Qty: 30 TABLET | Refills: 2 | Status: SHIPPED | OUTPATIENT
Start: 2020-03-03 | End: 2020-06-01

## 2020-05-31 DIAGNOSIS — I10 HYPERTENSION, BENIGN: ICD-10-CM

## 2020-06-01 RX ORDER — METOPROLOL SUCCINATE 100 MG/1
TABLET, EXTENDED RELEASE ORAL
Qty: 30 TABLET | Refills: 2 | Status: SHIPPED | OUTPATIENT
Start: 2020-06-01 | End: 2020-09-21

## 2020-06-01 RX ORDER — HYDROCHLOROTHIAZIDE 25 MG/1
TABLET ORAL
Qty: 30 TABLET | Refills: 2 | Status: SHIPPED | OUTPATIENT
Start: 2020-06-01 | End: 2020-09-21

## 2020-07-21 DIAGNOSIS — R53.81 MALAISE AND FATIGUE: ICD-10-CM

## 2020-07-21 DIAGNOSIS — R53.83 MALAISE AND FATIGUE: ICD-10-CM

## 2020-07-21 DIAGNOSIS — M10.9 GOUT, UNSPECIFIED CAUSE, UNSPECIFIED CHRONICITY, UNSPECIFIED SITE: ICD-10-CM

## 2020-07-21 DIAGNOSIS — E78.5 HYPERLIPIDEMIA, UNSPECIFIED HYPERLIPIDEMIA TYPE: ICD-10-CM

## 2020-07-21 DIAGNOSIS — Z12.5 SCREENING FOR PROSTATE CANCER: ICD-10-CM

## 2020-07-21 DIAGNOSIS — I10 HYPERTENSION, BENIGN: Primary | ICD-10-CM

## 2020-07-22 LAB
ALBUMIN SERPL-MCNC: 4.7 G/DL (ref 4–5)
ALBUMIN/GLOB SERPL: 1.6 {RATIO} (ref 1.2–2.2)
ALP SERPL-CCNC: 42 IU/L (ref 39–117)
ALT SERPL-CCNC: 36 IU/L (ref 0–44)
AST SERPL-CCNC: 34 IU/L (ref 0–40)
BASOPHILS # BLD AUTO: 0.1 X10E3/UL (ref 0–0.2)
BASOPHILS NFR BLD AUTO: 1 %
BILIRUB SERPL-MCNC: 0.4 MG/DL (ref 0–1.2)
BUN SERPL-MCNC: 22 MG/DL (ref 6–24)
BUN/CREAT SERPL: 18 (ref 9–20)
CALCIUM SERPL-MCNC: 10.3 MG/DL (ref 8.7–10.2)
CHLORIDE SERPL-SCNC: 100 MMOL/L (ref 96–106)
CHOLEST SERPL-MCNC: 198 MG/DL (ref 100–199)
CHOLEST/HDLC SERPL: 7.6 RATIO (ref 0–5)
CO2 SERPL-SCNC: 29 MMOL/L (ref 20–29)
CREAT SERPL-MCNC: 1.2 MG/DL (ref 0.76–1.27)
EOSINOPHIL # BLD AUTO: 0.3 X10E3/UL (ref 0–0.4)
EOSINOPHIL NFR BLD AUTO: 3 %
ERYTHROCYTE [DISTWIDTH] IN BLOOD BY AUTOMATED COUNT: 12.9 % (ref 11.6–15.4)
GLOBULIN SER CALC-MCNC: 2.9 G/DL (ref 1.5–4.5)
GLUCOSE SERPL-MCNC: 103 MG/DL (ref 65–99)
HCT VFR BLD AUTO: 49.2 % (ref 37.5–51)
HDLC SERPL-MCNC: 26 MG/DL
HGB BLD-MCNC: 16.6 G/DL (ref 13–17.7)
IMM GRANULOCYTES # BLD AUTO: 0.1 X10E3/UL (ref 0–0.1)
IMM GRANULOCYTES NFR BLD AUTO: 1 %
LDLC SERPL CALC-MCNC: 109 MG/DL (ref 0–99)
LYMPHOCYTES # BLD AUTO: 3.8 X10E3/UL (ref 0.7–3.1)
LYMPHOCYTES NFR BLD AUTO: 40 %
MCH RBC QN AUTO: 31.1 PG (ref 26.6–33)
MCHC RBC AUTO-ENTMCNC: 33.7 G/DL (ref 31.5–35.7)
MCV RBC AUTO: 92 FL (ref 79–97)
MONOCYTES # BLD AUTO: 0.6 X10E3/UL (ref 0.1–0.9)
MONOCYTES NFR BLD AUTO: 6 %
NEUTROPHILS # BLD AUTO: 4.9 X10E3/UL (ref 1.4–7)
NEUTROPHILS NFR BLD AUTO: 49 %
PLATELET # BLD AUTO: 219 X10E3/UL (ref 150–450)
POTASSIUM SERPL-SCNC: 3.7 MMOL/L (ref 3.5–5.2)
PROT SERPL-MCNC: 7.6 G/DL (ref 6–8.5)
PSA SERPL-MCNC: 2.6 NG/ML (ref 0–4)
RBC # BLD AUTO: 5.33 X10E6/UL (ref 4.14–5.8)
SODIUM SERPL-SCNC: 143 MMOL/L (ref 134–144)
TRIGL SERPL-MCNC: 313 MG/DL (ref 0–149)
TSH SERPL DL<=0.005 MIU/L-ACNC: 3.15 UIU/ML (ref 0.45–4.5)
VLDLC SERPL CALC-MCNC: 63 MG/DL (ref 5–40)
WBC # BLD AUTO: 9.7 X10E3/UL (ref 3.4–10.8)

## 2020-07-26 ENCOUNTER — RESULTS ENCOUNTER (OUTPATIENT)
Dept: FAMILY MEDICINE CLINIC | Facility: CLINIC | Age: 50
End: 2020-07-26

## 2020-07-26 DIAGNOSIS — M10.9 GOUT, UNSPECIFIED CAUSE, UNSPECIFIED CHRONICITY, UNSPECIFIED SITE: ICD-10-CM

## 2020-08-01 DIAGNOSIS — M10.9 GOUT, UNSPECIFIED CAUSE, UNSPECIFIED CHRONICITY, UNSPECIFIED SITE: ICD-10-CM

## 2020-08-01 DIAGNOSIS — I10 HYPERTENSION, BENIGN: ICD-10-CM

## 2020-08-01 DIAGNOSIS — E78.5 HYPERLIPIDEMIA, UNSPECIFIED HYPERLIPIDEMIA TYPE: ICD-10-CM

## 2020-08-05 RX ORDER — ALLOPURINOL 300 MG/1
TABLET ORAL
Qty: 90 TABLET | Refills: 3 | Status: SHIPPED | OUTPATIENT
Start: 2020-08-05 | End: 2021-07-16

## 2020-08-05 RX ORDER — FENOFIBRATE 145 MG/1
TABLET, COATED ORAL
Qty: 90 TABLET | Refills: 3 | Status: SHIPPED | OUTPATIENT
Start: 2020-08-05 | End: 2021-07-16

## 2020-08-05 RX ORDER — ATORVASTATIN CALCIUM 10 MG/1
TABLET, FILM COATED ORAL
Qty: 90 TABLET | Refills: 3 | Status: SHIPPED | OUTPATIENT
Start: 2020-08-05 | End: 2021-02-08 | Stop reason: SDUPTHER

## 2020-08-05 RX ORDER — LISINOPRIL 40 MG/1
TABLET ORAL
Qty: 90 TABLET | Refills: 3 | Status: SHIPPED | OUTPATIENT
Start: 2020-08-05 | End: 2021-07-16

## 2020-09-20 DIAGNOSIS — I10 HYPERTENSION, BENIGN: ICD-10-CM

## 2020-09-21 RX ORDER — HYDROCHLOROTHIAZIDE 25 MG/1
TABLET ORAL
Qty: 30 TABLET | Refills: 2 | Status: SHIPPED | OUTPATIENT
Start: 2020-09-21 | End: 2020-12-08

## 2020-09-21 RX ORDER — METOPROLOL SUCCINATE 100 MG/1
TABLET, EXTENDED RELEASE ORAL
Qty: 30 TABLET | Refills: 2 | Status: SHIPPED | OUTPATIENT
Start: 2020-09-21 | End: 2020-12-08

## 2020-12-08 DIAGNOSIS — I10 HYPERTENSION, BENIGN: ICD-10-CM

## 2020-12-08 RX ORDER — HYDROCHLOROTHIAZIDE 25 MG/1
TABLET ORAL
Qty: 30 TABLET | Refills: 2 | Status: SHIPPED | OUTPATIENT
Start: 2020-12-08 | End: 2021-02-24

## 2020-12-08 RX ORDER — METOPROLOL SUCCINATE 100 MG/1
TABLET, EXTENDED RELEASE ORAL
Qty: 30 TABLET | Refills: 2 | Status: SHIPPED | OUTPATIENT
Start: 2020-12-08 | End: 2021-02-24

## 2021-02-02 ENCOUNTER — TELEPHONE (OUTPATIENT)
Dept: FAMILY MEDICINE CLINIC | Facility: CLINIC | Age: 51
End: 2021-02-02

## 2021-02-02 ENCOUNTER — OFFICE VISIT (OUTPATIENT)
Dept: FAMILY MEDICINE CLINIC | Facility: CLINIC | Age: 51
End: 2021-02-02

## 2021-02-02 VITALS
HEIGHT: 71 IN | DIASTOLIC BLOOD PRESSURE: 100 MMHG | RESPIRATION RATE: 18 BRPM | SYSTOLIC BLOOD PRESSURE: 130 MMHG | HEART RATE: 96 BPM | BODY MASS INDEX: 33.88 KG/M2 | OXYGEN SATURATION: 98 % | TEMPERATURE: 97.3 F | WEIGHT: 242 LBS

## 2021-02-02 DIAGNOSIS — I10 HYPERTENSION, BENIGN: ICD-10-CM

## 2021-02-02 DIAGNOSIS — R07.9 CHEST PAIN, UNSPECIFIED TYPE: ICD-10-CM

## 2021-02-02 DIAGNOSIS — R53.83 MALAISE AND FATIGUE: ICD-10-CM

## 2021-02-02 DIAGNOSIS — R53.81 MALAISE AND FATIGUE: ICD-10-CM

## 2021-02-02 DIAGNOSIS — M10.9 GOUT, UNSPECIFIED CAUSE, UNSPECIFIED CHRONICITY, UNSPECIFIED SITE: ICD-10-CM

## 2021-02-02 DIAGNOSIS — Z00.01 ANNUAL VISIT FOR GENERAL ADULT MEDICAL EXAMINATION WITH ABNORMAL FINDINGS: Primary | ICD-10-CM

## 2021-02-02 DIAGNOSIS — E78.01 FAMILIAL HYPERCHOLESTEROLEMIA: ICD-10-CM

## 2021-02-02 DIAGNOSIS — E66.09 CLASS 1 OBESITY DUE TO EXCESS CALORIES WITH SERIOUS COMORBIDITY AND BODY MASS INDEX (BMI) OF 34.0 TO 34.9 IN ADULT: ICD-10-CM

## 2021-02-02 DIAGNOSIS — R94.31 ABNORMAL EKG: ICD-10-CM

## 2021-02-02 PROBLEM — E66.811 CLASS 1 OBESITY DUE TO EXCESS CALORIES WITH SERIOUS COMORBIDITY AND BODY MASS INDEX (BMI) OF 34.0 TO 34.9 IN ADULT: Status: ACTIVE | Noted: 2019-07-23

## 2021-02-02 PROCEDURE — 99396 PREV VISIT EST AGE 40-64: CPT | Performed by: FAMILY MEDICINE

## 2021-02-02 PROCEDURE — 36415 COLL VENOUS BLD VENIPUNCTURE: CPT | Performed by: FAMILY MEDICINE

## 2021-02-02 PROCEDURE — 93000 ELECTROCARDIOGRAM COMPLETE: CPT | Performed by: FAMILY MEDICINE

## 2021-02-02 NOTE — TELEPHONE ENCOUNTER
Let him know I reviewed the EKG that he had done today, he does have Q waves in some of his leads, this suggests that he could have some blockage in one of his heart arteries that has caused some oxygen deprivation to the inferior part of his heart in the past, the EKG is a rough test, considering these findings I would recommend that we proceed with a treadmill echocardiogram to further evaluate for a coronary artery blockage, if he is okay with that get him set up for it, okay to give him the self-pay cost, thanks

## 2021-02-02 NOTE — PROGRESS NOTES
Bethanie Sam is a 51 y.o. male.     Chief Complaint   Patient presents with   • Annual Exam   • Hypertension   • Hyperlipidemia       The patient is here: to discuss health maintenance and disease prevention to follow up on multiple medical problems.  Last comprehensive physical was on 7/24/2019.  Previous physical was performed by  Reyes Tubbs MD  Overall has: moderate activity with work/home activities, good appetite, feels well with minor complaints, decreased energy level and is sleeping poorly. Nutrition: eating a variety of foods and supplemental vitamins. Last tetanus shot was unknown.   Patient's last stress test was: 7/30/2012. Patients last PSA was 2.6 on 7/21/2019.  Last Completed Colonoscopy       Status Date      COLONOSCOPY No completions recorded           Hypertension  This is a chronic problem. The current episode started more than 1 year ago. The problem has been gradually worsening since onset. The problem is uncontrolled. Associated symptoms include malaise/fatigue, orthopnea, PND, shortness of breath and sweats. Pertinent negatives include no blurred vision, chest pain, headaches, neck pain, palpitations or peripheral edema. Agents associated with hypertension include NSAIDs. Risk factors for coronary artery disease include dyslipidemia, family history, obesity, sedentary lifestyle and stress. Current antihypertension treatment includes ACE inhibitors, beta blockers and diuretics. There are no compliance problems.    Hyperlipidemia  This is a chronic problem. The current episode started more than 1 year ago. Recent lipid tests were reviewed and are high. Exacerbating diseases include obesity. He has no history of diabetes or hypothyroidism. Associated symptoms include shortness of breath. Pertinent negatives include no chest pain. Current antihyperlipidemic treatment includes statins and herbal therapy. There are no compliance problems.  Risk factors for coronary artery  disease include dyslipidemia, hypertension, male sex and obesity.        Recent Hospitalizations:  No hospitalization(s) within the last year..  ccc      I personally reviewed and updated the patient's allergies, medications, problem list, and past medical, surgical, social, and family history. I have reviewed and confirmed the accuracy of the HPI and ROS as documented by the MA/LPN/RN Reyes Tubbs MD    Family History   Problem Relation Age of Onset   • Heart disease Mother    • Hypertension Mother    • Stroke Mother    • Heart disease Father    • Lung cancer Paternal Uncle    • Gout Maternal Grandmother    • Hearing loss Maternal Grandfather    • Hypertension Maternal Grandfather        Social History     Tobacco Use   • Smoking status: Never Smoker   • Smokeless tobacco: Never Used   Substance Use Topics   • Alcohol use: Yes     Frequency: Monthly or less   • Drug use: No       History reviewed. No pertinent surgical history.    Patient Active Problem List   Diagnosis   • Acute crisis reaction   • Chest pain   • Gout   • Headache   • Hemorrhoids   • Familial hypercholesterolemia   • Hypertension, benign   • Malaise and fatigue   • Nonorganic sleep disorder   • Obesity   • VERONICA (obstructive sleep apnea)   • Class 1 obesity due to excess calories with serious comorbidity and body mass index (BMI) of 34.0 to 34.9 in adult   • Annual visit for general adult medical examination with abnormal findings   • Screening for diabetes mellitus   • Seasonal allergic rhinitis due to pollen         Current Outpatient Medications:   •  allopurinol (ZYLOPRIM) 300 MG tablet, TAKE ONE TABLET BY MOUTH ONCE DAILY TO CONTROL URIC ACID, Disp: 90 tablet, Rfl: 3  •  aspirin 81 MG EC tablet, Take 81 mg by mouth Daily., Disp: , Rfl:   •  atorvastatin (LIPITOR) 10 MG tablet, TAKE ONE TABLET BY MOUTH ONCE DAILY CHOLESTEROL, Disp: 90 tablet, Rfl: 3  •  Cholecalciferol (VITAMIN D3) 3000 units tablet, Take 1 tablet by mouth., Disp: , Rfl:   •   fenofibrate (TRICOR) 145 MG tablet, TAKE ONE TABLET BY MOUTH ONCE DAILY, Disp: 90 tablet, Rfl: 3  •  fluticasone (FLONASE) 50 MCG/ACT nasal spray, 2 sprays into the nostril(s) as directed by provider Daily., Disp: , Rfl:   •  hydroCHLOROthiazide (HYDRODIURIL) 25 MG tablet, TAKE ONE TABLET BY MOUTH ONCE DAILY, Disp: 30 tablet, Rfl: 2  •  lisinopril (PRINIVIL,ZESTRIL) 40 MG tablet, TAKE ONE TABLET BY MOUTH ONCE DAILY FOR BLOOD PRESSURE, Disp: 90 tablet, Rfl: 3  •  metoprolol succinate XL (TOPROL-XL) 100 MG 24 hr tablet, TAKE ONE TABLET BY MOUTH ONCE DAILY, Disp: 30 tablet, Rfl: 2  •  NON FORMULARY, TESTboost has hawthorn berry, ginseng root, ashwagandha root, tribulus terrestris fruit, Disp: , Rfl:   •  Omega-3 Fatty Acids (FISH OIL OMEGA-3 PO), Take 1,400 mg by mouth., Disp: , Rfl:   •  sertraline (ZOLOFT) 50 MG tablet, TAKE ONE TABLET BY MOUTH ONCE DAILY, Disp: 30 tablet, Rfl: 11    Review of Systems   Constitutional: Positive for malaise/fatigue. Negative for chills and diaphoresis.   HENT: Negative for trouble swallowing and voice change.    Eyes: Negative for blurred vision and visual disturbance.   Respiratory: Positive for shortness of breath.    Cardiovascular: Positive for orthopnea and PND. Negative for chest pain and palpitations.   Gastrointestinal: Negative for abdominal pain and nausea.   Endocrine: Negative for polydipsia and polyphagia.   Genitourinary: Negative for hematuria.   Musculoskeletal: Negative for neck pain and neck stiffness.   Skin: Negative for color change and pallor.   Allergic/Immunologic: Negative for immunocompromised state.   Neurological: Negative for seizures and syncope.   Hematological: Negative for adenopathy.   Psychiatric/Behavioral: Negative for hallucinations, sleep disturbance and suicidal ideas.       I have reviewed and confirmed the accuracy of the ROS as documented by the MA/LPN/RN Reyes Tubbs MD      Objective   /100 (BP Location: Left arm, Patient Position:  "Sitting)   Pulse 96   Temp 97.3 °F (36.3 °C)   Resp 18   Ht 179.1 cm (70.5\")   Wt 110 kg (242 lb)   SpO2 98%   BMI 34.23 kg/m²   BP Readings from Last 3 Encounters:   02/02/21 130/100   02/03/20 130/80   12/02/19 (!) 180/120     Wt Readings from Last 3 Encounters:   02/02/21 110 kg (242 lb)   02/03/20 108 kg (239 lb)   12/02/19 109 kg (239 lb 9.6 oz)     Physical Exam  Constitutional:       Appearance: Normal appearance. He is well-developed. He is not diaphoretic.   HENT:      Head: Normocephalic.      Right Ear: Tympanic membrane, ear canal and external ear normal.      Left Ear: Tympanic membrane, ear canal and external ear normal.      Nose: Nose normal.   Eyes:      General: Lids are normal.      Conjunctiva/sclera: Conjunctivae normal.      Pupils: Pupils are equal, round, and reactive to light.   Neck:      Thyroid: No thyromegaly.      Vascular: No carotid bruit or JVD.      Trachea: No tracheal deviation.   Cardiovascular:      Rate and Rhythm: Normal rate and regular rhythm.      Pulses: Normal pulses.      Heart sounds: Normal heart sounds, S1 normal and S2 normal. No murmur. No friction rub. No gallop.    Pulmonary:      Effort: Pulmonary effort is normal. No accessory muscle usage.      Breath sounds: Normal breath sounds. No stridor. No decreased breath sounds, wheezing, rhonchi or rales.   Abdominal:      General: Bowel sounds are normal. There is no distension.      Palpations: Abdomen is soft. Abdomen is not rigid. There is no mass or pulsatile mass.      Tenderness: There is no abdominal tenderness. There is no guarding or rebound. Negative signs include Herr's sign.      Hernia: No hernia is present.   Lymphadenopathy:      Head:      Right side of head: No submental, submandibular, tonsillar, preauricular, posterior auricular or occipital adenopathy.      Left side of head: No submental, submandibular, tonsillar, preauricular, posterior auricular or occipital adenopathy.      Cervical: " No cervical adenopathy.   Skin:     General: Skin is warm and dry.      Coloration: Skin is not pale.   Neurological:      Mental Status: He is alert and oriented to person, place, and time.      Cranial Nerves: No cranial nerve deficit.      Sensory: No sensory deficit.      Coordination: Coordination normal.      Gait: Gait normal.      Deep Tendon Reflexes: Reflexes are normal and symmetric.         Data / Lab Results:    No results found for: HGBA1C  Lab Results   Component Value Date     (H) 07/21/2020     Lab Results   Component Value Date     (H) 07/21/2020    LDL 98 01/31/2020     (H) 07/18/2019     Lab Results   Component Value Date    CHOL 211 (H) 02/12/2018    CHOL 177 01/03/2018    CHOL 246 (H) 03/22/2017     Lab Results   Component Value Date    TRIG 313 (H) 07/21/2020    TRIG 327 (H) 01/31/2020    TRIG 379 (H) 07/18/2019     Lab Results   Component Value Date    HDL 26 (L) 07/21/2020    HDL 29 (L) 01/31/2020    HDL 29 (L) 07/18/2019     Lab Results   Component Value Date    PSA 2.6 07/21/2020     Lab Results   Component Value Date    WBC 9.7 07/21/2020    HGB 16.6 07/21/2020    HCT 49.2 07/21/2020    MCV 92 07/21/2020     07/21/2020     Lab Results   Component Value Date    TSH 3.150 07/21/2020      Lab Results   Component Value Date    GLUCOSE 95 02/17/2018    BUN 22 07/21/2020    CREATININE 1.20 07/21/2020    EGFRIFNONA 70 07/21/2020    EGFRIFAFRI 81 07/21/2020    BCR 18 07/21/2020    K 3.7 07/21/2020    CO2 29 07/21/2020    CALCIUM 10.3 (H) 07/21/2020    PROTENTOTREF 7.6 07/21/2020    ALBUMIN 4.7 07/21/2020    LABIL2 1.6 07/21/2020    AST 34 07/21/2020    ALT 36 07/21/2020     Lab Results   Component Value Date    BERNICE  02/13/2018     NEGATIVE This result is designed to aid in the diagnosis of many of the    BERNICE  02/13/2018     systemic autoimmune disorders and is not diagnostic by itself.  Test results    BERNICE  02/13/2018     should be interpreted in conjunction with the  clinical evaluation of the    BERNICE  02/13/2018     patient.  SLE patients undergoing steroid therapy may have negative results.      Lab Results   Component Value Date    CRP 18.99 (H) 02/16/2018      No results found for: IRON, TIBC, FERRITIN   No results found for: [id number]     Age-appropriate Screening Schedule:  Refer to the list below for future screening recommendations based on patient's age, sex and/or medical conditions. Orders for these recommended tests are listed in the plan section. The patient has been provided with a written plan.    Health Maintenance   Topic Date Due   • COLONOSCOPY  1970   • TDAP/TD VACCINES (1 - Tdap) 01/02/1989   • ZOSTER VACCINE (1 of 2) 01/02/2020   • INFLUENZA VACCINE  08/01/2020   • PROSTATE CANCER SCREENING  07/21/2021   • LIPID PANEL  07/21/2021           Assessment/Plan      Medications        Problem List         LOS    Physical.  Doing well.  Recommend update tetanus vaccine through [facility] [employer].  Flu vaccine declined.  Discussed health maintenance, screening test, lifestyle modification.  Hypertension.      Much improved today on Rx.  Discussed low-sodium diet.  EKG with Q waves in inferior leads, no acute ST/T wave changes..  Recommend cardiac stress testing he states he will consider.  History of benign cardiac stress testing 2012.  Hyperlipidemia.  Much improved on atorvastatin, TriCor.  Restart fish oil. History of triglycerides greater than 1000.  Discussed diet, exercise, lifestyle modification.  Follow-up recheck  VERONICA.  Recommend repeat sleep titration, he declined secondary to cost.  Gout.  Improved on allopurinol.  Follow-up recheck uric acid.  Depression.    Improved on sertraline.  Fatty liver.  Stable, mild elevation LFTs, continue fish oil, continue to monitor.  Elevated fasting blood sugar.    History of 117/borderline.  Discussed diet, exercise, lifestyle modification.    Recheck fasting labs.    Diagnoses and all orders for this visit:    1. Annual  visit for general adult medical examination with abnormal findings (Primary)  -     CBC & Differential  -     Comprehensive Metabolic Panel  -     TSH  -     Lipid Panel With / Chol / HDL Ratio    2. Hypertension, benign  -     CBC & Differential  -     Comprehensive Metabolic Panel  -     TSH    3. Familial hypercholesterolemia  -     Lipid Panel With / Chol / HDL Ratio    4. Class 1 obesity due to excess calories with serious comorbidity and body mass index (BMI) of 34.0 to 34.9 in adult    5. Gout, unspecified cause, unspecified chronicity, unspecified site  -     Uric Acid    6. Malaise and fatigue  -     CBC & Differential  -     Comprehensive Metabolic Panel  -     TSH    7. Chest pain, unspecified type  -     Rhythm Ecg, Report              Expected course, medications, and adverse effects discussed.  Call or return if worsening or persistent symptoms.  I wore protective equipment throughout this patient encounter including a mask, gloves, and eye protection.  Hand hygiene was performed before donning protective equipment and after removal when leaving the room. The complete contents of the Assessment and Plan and Data / Lab Results as documented above have been reviewed and addressed by myself with the patient today as part of an ongoing evaluation / treatment plan.  If some of the documentation has been copied from a previous note and is unchanged it indicates that this problem / plan has been assessed today but is stable from a previous visit and no changes have been recommended.

## 2021-02-03 LAB
ALBUMIN SERPL-MCNC: 4.6 G/DL (ref 3.8–4.9)
ALBUMIN/GLOB SERPL: 1.5 {RATIO} (ref 1.2–2.2)
ALP SERPL-CCNC: 50 IU/L (ref 39–117)
ALT SERPL-CCNC: 42 IU/L (ref 0–44)
AST SERPL-CCNC: 41 IU/L (ref 0–40)
BASOPHILS # BLD AUTO: 0.1 X10E3/UL (ref 0–0.2)
BASOPHILS NFR BLD AUTO: 1 %
BILIRUB SERPL-MCNC: 0.4 MG/DL (ref 0–1.2)
BUN SERPL-MCNC: 17 MG/DL (ref 6–24)
BUN/CREAT SERPL: 15 (ref 9–20)
CALCIUM SERPL-MCNC: 10.2 MG/DL (ref 8.7–10.2)
CHLORIDE SERPL-SCNC: 101 MMOL/L (ref 96–106)
CHOLEST SERPL-MCNC: 199 MG/DL (ref 100–199)
CHOLEST/HDLC SERPL: 7.1 RATIO (ref 0–5)
CO2 SERPL-SCNC: 23 MMOL/L (ref 20–29)
CREAT SERPL-MCNC: 1.14 MG/DL (ref 0.76–1.27)
EOSINOPHIL # BLD AUTO: 0.3 X10E3/UL (ref 0–0.4)
EOSINOPHIL NFR BLD AUTO: 3 %
ERYTHROCYTE [DISTWIDTH] IN BLOOD BY AUTOMATED COUNT: 13 % (ref 11.6–15.4)
GLOBULIN SER CALC-MCNC: 3.1 G/DL (ref 1.5–4.5)
GLUCOSE SERPL-MCNC: 105 MG/DL (ref 65–99)
HCT VFR BLD AUTO: 51.4 % (ref 37.5–51)
HDLC SERPL-MCNC: 28 MG/DL
HGB BLD-MCNC: 17.7 G/DL (ref 13–17.7)
IMM GRANULOCYTES # BLD AUTO: 0.1 X10E3/UL (ref 0–0.1)
IMM GRANULOCYTES NFR BLD AUTO: 1 %
LDLC SERPL CALC-MCNC: 118 MG/DL (ref 0–99)
LYMPHOCYTES # BLD AUTO: 3.6 X10E3/UL (ref 0.7–3.1)
LYMPHOCYTES NFR BLD AUTO: 37 %
MCH RBC QN AUTO: 30.6 PG (ref 26.6–33)
MCHC RBC AUTO-ENTMCNC: 34.4 G/DL (ref 31.5–35.7)
MCV RBC AUTO: 89 FL (ref 79–97)
MONOCYTES # BLD AUTO: 0.6 X10E3/UL (ref 0.1–0.9)
MONOCYTES NFR BLD AUTO: 6 %
NEUTROPHILS # BLD AUTO: 5.2 X10E3/UL (ref 1.4–7)
NEUTROPHILS NFR BLD AUTO: 52 %
PLATELET # BLD AUTO: 260 X10E3/UL (ref 150–450)
POTASSIUM SERPL-SCNC: 3.8 MMOL/L (ref 3.5–5.2)
PROT SERPL-MCNC: 7.7 G/DL (ref 6–8.5)
RBC # BLD AUTO: 5.79 X10E6/UL (ref 4.14–5.8)
SODIUM SERPL-SCNC: 141 MMOL/L (ref 134–144)
TRIGL SERPL-MCNC: 300 MG/DL (ref 0–149)
TSH SERPL DL<=0.005 MIU/L-ACNC: 2.51 UIU/ML (ref 0.45–4.5)
URATE SERPL-MCNC: 6 MG/DL (ref 3.8–8.4)
VLDLC SERPL CALC-MCNC: 53 MG/DL (ref 5–40)
WBC # BLD AUTO: 9.8 X10E3/UL (ref 3.4–10.8)

## 2021-02-08 DIAGNOSIS — E78.5 HYPERLIPIDEMIA, UNSPECIFIED HYPERLIPIDEMIA TYPE: ICD-10-CM

## 2021-02-08 RX ORDER — ATORVASTATIN CALCIUM 20 MG/1
20 TABLET, FILM COATED ORAL DAILY
Qty: 90 TABLET | Refills: 0 | Status: SHIPPED | OUTPATIENT
Start: 2021-02-08 | End: 2021-05-25 | Stop reason: SDUPTHER

## 2021-02-24 DIAGNOSIS — I10 HYPERTENSION, BENIGN: ICD-10-CM

## 2021-02-24 RX ORDER — METOPROLOL SUCCINATE 100 MG/1
TABLET, EXTENDED RELEASE ORAL
Qty: 30 TABLET | Refills: 2 | Status: SHIPPED | OUTPATIENT
Start: 2021-02-24 | End: 2021-05-28 | Stop reason: SDUPTHER

## 2021-02-24 RX ORDER — HYDROCHLOROTHIAZIDE 25 MG/1
TABLET ORAL
Qty: 30 TABLET | Refills: 2 | Status: SHIPPED | OUTPATIENT
Start: 2021-02-24 | End: 2021-05-28 | Stop reason: SDUPTHER

## 2021-05-25 DIAGNOSIS — E78.5 HYPERLIPIDEMIA, UNSPECIFIED HYPERLIPIDEMIA TYPE: ICD-10-CM

## 2021-05-25 RX ORDER — ATORVASTATIN CALCIUM 20 MG/1
20 TABLET, FILM COATED ORAL DAILY
Qty: 90 TABLET | Refills: 0 | Status: SHIPPED | OUTPATIENT
Start: 2021-05-25 | End: 2021-08-13

## 2021-05-27 PROBLEM — I10 ESSENTIAL HYPERTENSION: Status: ACTIVE | Noted: 2021-05-27

## 2021-05-27 NOTE — PROGRESS NOTES
Date of Office Visit: 2021  Encounter Provider: Dr. Umer Jason  Place of Service: [facility] CARDIOLOGY [place]  Patient Name: Francesco Sam  :1970  Reyes Tubbs MD    Chief Complaint   Patient presents with   • Hypertension     consult   • Chest Pain   • Abnormal ECG     History of Present Illness:    I am pleased to see Mr. Sam in my office today as a new consultation.    As you know, patient is 51-year-old white gentleman whose past medical history significant for hypertension, hyperlipidemia, history of gout, who is referred to me for symptom of chest pain and abnormal EKG.    Patient reports that he is having symptom of chest pain described as a dull ache on the left side of the chest with radiation to the left shoulder.  This is usually precipitated with exertion.  However there is no clear pattern.  Patient also complained of postprandial chest pain.  Patient is short of breath on exertion.  Patient denies any orthopnea, PND, syncope or presyncope.  No leg edema noted.  However patient does report intermittent edema of hands and legs.    Patient does not smoke or abuse alcohol.  Patient does not have previous history of CAD, PCI or MI.    EKG showed sinus tachycardia.  There is poor progression of R wave.  Rightward axis present.    I would recommend to proceed with stress echocardiography.  His blood pressure is elevated but he has not taken his medication this morning and yesterday.  I advised him to be compliant with his medication.  Patient is advised to monitor the blood pressure and bring the logbook on next visit I would not recommend change in medication at present        Past Medical History:   Diagnosis Date   • Hyperlipidemia    • Hypertension          History reviewed. No pertinent surgical history.        Current Outpatient Medications:   •  allopurinol (ZYLOPRIM) 300 MG tablet, TAKE ONE TABLET BY MOUTH ONCE DAILY TO CONTROL URIC ACID, Disp: 90 tablet, Rfl: 3  •   aspirin 81 MG EC tablet, Take 81 mg by mouth Daily., Disp: , Rfl:   •  atorvastatin (LIPITOR) 20 MG tablet, Take 1 tablet by mouth Daily., Disp: 90 tablet, Rfl: 0  •  Cholecalciferol (VITAMIN D3) 3000 units tablet, Take 1 tablet by mouth., Disp: , Rfl:   •  fenofibrate (TRICOR) 145 MG tablet, TAKE ONE TABLET BY MOUTH ONCE DAILY, Disp: 90 tablet, Rfl: 3  •  fluticasone (FLONASE) 50 MCG/ACT nasal spray, 2 sprays into the nostril(s) as directed by provider Daily., Disp: , Rfl:   •  hydroCHLOROthiazide (HYDRODIURIL) 25 MG tablet, TAKE ONE TABLET BY MOUTH ONCE DAILY, Disp: 30 tablet, Rfl: 2  •  lisinopril (PRINIVIL,ZESTRIL) 40 MG tablet, TAKE ONE TABLET BY MOUTH ONCE DAILY FOR BLOOD PRESSURE, Disp: 90 tablet, Rfl: 3  •  metoprolol succinate XL (TOPROL-XL) 100 MG 24 hr tablet, TAKE ONE TABLET BY MOUTH ONCE DAILY, Disp: 30 tablet, Rfl: 2  •  NON FORMULARY, TESTboost has hawthorn berry, ginseng root, ashwagandha root, tribulus terrestris fruit, Disp: , Rfl:   •  Omega-3 Fatty Acids (FISH OIL OMEGA-3 PO), Take 1,400 mg by mouth., Disp: , Rfl:   •  sertraline (ZOLOFT) 50 MG tablet, TAKE ONE TABLET BY MOUTH ONCE DAILY, Disp: 30 tablet, Rfl: 11      Social History     Socioeconomic History   • Marital status:      Spouse name: Not on file   • Number of children: Not on file   • Years of education: Not on file   • Highest education level: Not on file   Tobacco Use   • Smoking status: Never Smoker   • Smokeless tobacco: Never Used   Vaping Use   • Vaping Use: Never used   Substance and Sexual Activity   • Alcohol use: Yes   • Drug use: No   • Sexual activity: Defer         Review of Systems   Constitutional: Negative for chills and fever.   HENT: Negative for ear discharge and nosebleeds.    Eyes: Negative for discharge and redness.   Cardiovascular: Positive for chest pain. Negative for orthopnea, palpitations, paroxysmal nocturnal dyspnea and syncope.   Respiratory: Positive for shortness of breath. Negative for cough  "and wheezing.    Endocrine: Negative for heat intolerance.   Skin: Negative for rash.   Musculoskeletal: Positive for joint pain. Negative for arthritis and myalgias.   Gastrointestinal: Negative for abdominal pain, melena, nausea and vomiting.   Genitourinary: Negative for dysuria and hematuria.   Neurological: Negative for dizziness, light-headedness, numbness and tremors.   Psychiatric/Behavioral: Negative for depression. The patient is not nervous/anxious.        Procedures      ECG 12 Lead    Date/Time: 5/28/2021 9:24 AM  Performed by: Umer Jason MD  Authorized by: Umer Jason MD   Previous ECG: no previous ECG available  Rhythm: sinus rhythm  QRS axis: right  Other findings: poor R wave progression    Clinical impression: abnormal EKG            ECG 12 Lead    (Results Pending)           Objective:    /100   Pulse 102   Ht 179.1 cm (70.51\")   Wt 110 kg (242 lb)   BMI 34.22 kg/m²         Constitutional:       Appearance: Well-developed.   Eyes:      General: No scleral icterus.        Right eye: No discharge.   HENT:      Head: Normocephalic and atraumatic.   Neck:      Thyroid: No thyromegaly.      Lymphadenopathy: No cervical adenopathy.   Pulmonary:      Effort: Pulmonary effort is normal. No respiratory distress.      Breath sounds: Normal breath sounds. No wheezing. No rales.   Cardiovascular:      Normal rate. Regular rhythm.      No gallop.   Abdominal:      Tenderness: There is no abdominal tenderness.   Skin:     Findings: No erythema or rash.   Neurological:      Mental Status: Alert and oriented to person, place, and time.             Assessment:       Diagnosis Plan   1. Chest pain, unspecified type  ECG 12 Lead    Adult Stress Echo W/ Cont or Stress Agent if Necessary Per Protocol   2. Abnormal EKG  ECG 12 Lead    Adult Stress Echo W/ Cont or Stress Agent if Necessary Per Protocol   3. Essential hypertension  ECG 12 Lead    Adult Stress Echo W/ Cont or Stress Agent if Necessary Per " Protocol            Plan:       MDM:    1.  Chest pain:    I would recommend stress echocardiography    2.  Abnormal EKG:    Patient has rightward axis and poor progression of R wave.  I would proceed with echocardiogram to assess the wall motion abnormality    3.  Hypertension:    His blood pressure is very high today but his blood pressure with PCP office was normal.  Patient admits that he has not taken his medication.  Compliance and timing of the medication emphasized to the patient.  I would see the patient in 6 weeks.

## 2021-05-28 ENCOUNTER — OFFICE VISIT (OUTPATIENT)
Dept: CARDIOLOGY | Facility: CLINIC | Age: 51
End: 2021-05-28

## 2021-05-28 VITALS
HEART RATE: 102 BPM | WEIGHT: 242 LBS | DIASTOLIC BLOOD PRESSURE: 100 MMHG | HEIGHT: 71 IN | BODY MASS INDEX: 33.88 KG/M2 | SYSTOLIC BLOOD PRESSURE: 173 MMHG

## 2021-05-28 DIAGNOSIS — R94.31 ABNORMAL EKG: ICD-10-CM

## 2021-05-28 DIAGNOSIS — I10 HYPERTENSION, BENIGN: ICD-10-CM

## 2021-05-28 DIAGNOSIS — R07.9 CHEST PAIN, UNSPECIFIED TYPE: Primary | ICD-10-CM

## 2021-05-28 DIAGNOSIS — I10 ESSENTIAL HYPERTENSION: ICD-10-CM

## 2021-05-28 PROCEDURE — 99204 OFFICE O/P NEW MOD 45 MIN: CPT | Performed by: INTERNAL MEDICINE

## 2021-05-28 PROCEDURE — 93000 ELECTROCARDIOGRAM COMPLETE: CPT | Performed by: INTERNAL MEDICINE

## 2021-05-28 RX ORDER — HYDROCHLOROTHIAZIDE 25 MG/1
25 TABLET ORAL DAILY
Qty: 30 TABLET | Refills: 2 | Status: SHIPPED | OUTPATIENT
Start: 2021-05-28 | End: 2021-08-13

## 2021-05-28 RX ORDER — METOPROLOL SUCCINATE 100 MG/1
100 TABLET, EXTENDED RELEASE ORAL DAILY
Qty: 30 TABLET | Refills: 2 | Status: SHIPPED | OUTPATIENT
Start: 2021-05-28 | End: 2021-08-13

## 2021-06-04 ENCOUNTER — HOSPITAL ENCOUNTER (OUTPATIENT)
Dept: CARDIOLOGY | Facility: HOSPITAL | Age: 51
Discharge: HOME | End: 2021-06-04
Admitting: INTERNAL MEDICINE

## 2021-06-04 DIAGNOSIS — I10 ESSENTIAL HYPERTENSION: ICD-10-CM

## 2021-06-04 DIAGNOSIS — R94.31 ABNORMAL EKG: ICD-10-CM

## 2021-06-04 DIAGNOSIS — R07.9 CHEST PAIN, UNSPECIFIED TYPE: ICD-10-CM

## 2021-06-04 PROCEDURE — 93350 STRESS TTE ONLY: CPT

## 2021-06-04 PROCEDURE — 93320 DOPPLER ECHO COMPLETE: CPT | Performed by: INTERNAL MEDICINE

## 2021-06-04 PROCEDURE — 93350 STRESS TTE ONLY: CPT | Performed by: INTERNAL MEDICINE

## 2021-06-04 PROCEDURE — 25010000002 SULFUR HEXAFLUORIDE MICROSPH 60.7-25 MG RECONSTITUTED SUSPENSION: Performed by: INTERNAL MEDICINE

## 2021-06-04 PROCEDURE — 93017 CV STRESS TEST TRACING ONLY: CPT

## 2021-06-04 PROCEDURE — 93018 CV STRESS TEST I&R ONLY: CPT | Performed by: INTERNAL MEDICINE

## 2021-06-04 PROCEDURE — 93016 CV STRESS TEST SUPVJ ONLY: CPT | Performed by: INTERNAL MEDICINE

## 2021-06-04 PROCEDURE — 93320 DOPPLER ECHO COMPLETE: CPT

## 2021-06-04 PROCEDURE — 93352 ADMIN ECG CONTRAST AGENT: CPT | Performed by: INTERNAL MEDICINE

## 2021-06-04 PROCEDURE — 93325 DOPPLER ECHO COLOR FLOW MAPG: CPT

## 2021-06-04 PROCEDURE — 93325 DOPPLER ECHO COLOR FLOW MAPG: CPT | Performed by: INTERNAL MEDICINE

## 2021-06-04 RX ADMIN — SULFUR HEXAFLUORIDE 8 ML: KIT at 12:30

## 2021-06-05 LAB
BH CV ECHO MEAS - AO MAX PG (FULL): -2 MMHG
BH CV ECHO MEAS - AO MAX PG: 5.9 MMHG
BH CV ECHO MEAS - AO MEAN PG (FULL): -1.5 MMHG
BH CV ECHO MEAS - AO MEAN PG: 3.4 MMHG
BH CV ECHO MEAS - AO ROOT AREA (BSA CORRECTED): 1.4
BH CV ECHO MEAS - AO ROOT AREA: 7.8 CM^2
BH CV ECHO MEAS - AO ROOT DIAM: 3.2 CM
BH CV ECHO MEAS - AO V2 MAX: 121.5 CM/SEC
BH CV ECHO MEAS - AO V2 MEAN: 89.4 CM/SEC
BH CV ECHO MEAS - AO V2 VTI: 20.7 CM
BH CV ECHO MEAS - ASC AORTA: 3.1 CM
BH CV ECHO MEAS - AVA(I,A): 4.8 CM^2
BH CV ECHO MEAS - AVA(I,D): 4.8 CM^2
BH CV ECHO MEAS - AVA(V,A): 4.6 CM^2
BH CV ECHO MEAS - AVA(V,D): 4.6 CM^2
BH CV ECHO MEAS - BSA(HAYCOCK): 2.4 M^2
BH CV ECHO MEAS - BSA: 2.3 M^2
BH CV ECHO MEAS - BZI_BMI: 33.8 KILOGRAMS/M^2
BH CV ECHO MEAS - BZI_METRIC_HEIGHT: 180.3 CM
BH CV ECHO MEAS - BZI_METRIC_WEIGHT: 109.8 KG
BH CV ECHO MEAS - EDV(CUBED): 60.2 ML
BH CV ECHO MEAS - EDV(TEICH): 66.7 ML
BH CV ECHO MEAS - EF(CUBED): 80.3 %
BH CV ECHO MEAS - EF(TEICH): 73.4 %
BH CV ECHO MEAS - ESV(CUBED): 11.9 ML
BH CV ECHO MEAS - ESV(TEICH): 17.8 ML
BH CV ECHO MEAS - FS: 41.8 %
BH CV ECHO MEAS - IVS/LVPW: 1.1
BH CV ECHO MEAS - IVSD: 1.5 CM
BH CV ECHO MEAS - LA DIMENSION(2D): 3.8 CM
BH CV ECHO MEAS - LV MASS(C)D: 206.8 GRAMS
BH CV ECHO MEAS - LV MASS(C)DI: 90.4 GRAMS/M^2
BH CV ECHO MEAS - LV MAX PG: 7.9 MMHG
BH CV ECHO MEAS - LV MEAN PG: 4.9 MMHG
BH CV ECHO MEAS - LV V1 MAX: 140.4 CM/SEC
BH CV ECHO MEAS - LV V1 MEAN: 104.1 CM/SEC
BH CV ECHO MEAS - LV V1 VTI: 25.2 CM
BH CV ECHO MEAS - LVIDD: 3.9 CM
BH CV ECHO MEAS - LVIDS: 2.3 CM
BH CV ECHO MEAS - LVOT AREA: 4 CM^2
BH CV ECHO MEAS - LVOT DIAM: 2.3 CM
BH CV ECHO MEAS - LVPWD: 1.3 CM
BH CV ECHO MEAS - MR MAX PG: 13.7 MMHG
BH CV ECHO MEAS - MR MAX VEL: 184.7 CM/SEC
BH CV ECHO MEAS - MV A MAX VEL: 85.5 CM/SEC
BH CV ECHO MEAS - MV DEC SLOPE: 473.3 CM/SEC^2
BH CV ECHO MEAS - MV DEC TIME: 0.16 SEC
BH CV ECHO MEAS - MV E MAX VEL: 74.3 CM/SEC
BH CV ECHO MEAS - MV E/A: 0.87
BH CV ECHO MEAS - MV MAX PG: 3.4 MMHG
BH CV ECHO MEAS - MV MEAN PG: 1.7 MMHG
BH CV ECHO MEAS - MV V2 MAX: 92.7 CM/SEC
BH CV ECHO MEAS - MV V2 MEAN: 62.1 CM/SEC
BH CV ECHO MEAS - MV V2 VTI: 18.3 CM
BH CV ECHO MEAS - MVA(VTI): 5.5 CM^2
BH CV ECHO MEAS - PA MAX PG (FULL): 6.9 MMHG
BH CV ECHO MEAS - PA MAX PG: 8.1 MMHG
BH CV ECHO MEAS - PA MEAN PG (FULL): 4.1 MMHG
BH CV ECHO MEAS - PA MEAN PG: 4.8 MMHG
BH CV ECHO MEAS - PA V2 MAX: 142.2 CM/SEC
BH CV ECHO MEAS - PA V2 MEAN: 106.1 CM/SEC
BH CV ECHO MEAS - PA V2 VTI: 25.3 CM
BH CV ECHO MEAS - PI END-D VEL: 96.8 CM/SEC
BH CV ECHO MEAS - PULM A REVS DUR: 0.11 SEC
BH CV ECHO MEAS - PULM A REVS VEL: 23.5 CM/SEC
BH CV ECHO MEAS - PULM DIAS VEL: 38.7 CM/SEC
BH CV ECHO MEAS - PULM S/D: 1.2
BH CV ECHO MEAS - PULM SYS VEL: 45.2 CM/SEC
BH CV ECHO MEAS - PVA(I,A): 1.4 CM^2
BH CV ECHO MEAS - PVA(I,D): 1.4 CM^2
BH CV ECHO MEAS - PVA(V,A): 1.2 CM^2
BH CV ECHO MEAS - PVA(V,D): 1.2 CM^2
BH CV ECHO MEAS - QP/QS: 0.36
BH CV ECHO MEAS - RAP SYSTOLE: 8 MMHG
BH CV ECHO MEAS - RV MAX PG: 1.1 MMHG
BH CV ECHO MEAS - RV MEAN PG: 0.7 MMHG
BH CV ECHO MEAS - RV V1 MAX: 53.4 CM/SEC
BH CV ECHO MEAS - RV V1 MEAN: 39.9 CM/SEC
BH CV ECHO MEAS - RV V1 VTI: 11.6 CM
BH CV ECHO MEAS - RVDD: 2.9 CM
BH CV ECHO MEAS - RVOT AREA: 3.1 CM^2
BH CV ECHO MEAS - RVOT DIAM: 2 CM
BH CV ECHO MEAS - SI(AO): 70.9 ML/M^2
BH CV ECHO MEAS - SI(CUBED): 21.1 ML/M^2
BH CV ECHO MEAS - SI(LVOT): 43.9 ML/M^2
BH CV ECHO MEAS - SI(TEICH): 21.4 ML/M^2
BH CV ECHO MEAS - SV(AO): 162.1 ML
BH CV ECHO MEAS - SV(CUBED): 48.3 ML
BH CV ECHO MEAS - SV(LVOT): 100.4 ML
BH CV ECHO MEAS - SV(RVOT): 36.4 ML
BH CV ECHO MEAS - SV(TEICH): 49 ML
BH CV STRESS RECOVERY HR: 90 BPM
PERCENT MAX PREDICTED HR: 88.17 %
STRESS BASELINE BP: NORMAL MMHG
STRESS BASELINE HR: 85 BPM
STRESS PERCENT HR: 104 %
STRESS POST ESTIMATED WORKLOAD: 7 METS
STRESS POST EXERCISE DUR MIN: 6 MIN
STRESS POST PEAK BP: NORMAL MMHG
STRESS POST PEAK HR: 149 BPM

## 2021-06-07 ENCOUNTER — TELEPHONE (OUTPATIENT)
Dept: CARDIOLOGY | Facility: CLINIC | Age: 51
End: 2021-06-07

## 2021-06-11 ENCOUNTER — TELEPHONE (OUTPATIENT)
Dept: FAMILY MEDICINE CLINIC | Facility: CLINIC | Age: 51
End: 2021-06-11

## 2021-06-11 NOTE — TELEPHONE ENCOUNTER
----- Message from Francesco Sam sent at 6/10/2021  2:32 PM EDT -----  Regarding: Prescription Question  Contact: [phone]  I have found myself with my annual nasal/chest infection from allergy drainage.   What do I need to do to get a z pack and my allergy meds renewed?

## 2021-06-14 DIAGNOSIS — J06.9 UPPER RESPIRATORY TRACT INFECTION, UNSPECIFIED TYPE: Primary | ICD-10-CM

## 2021-06-14 DIAGNOSIS — J30.1 SEASONAL ALLERGIC RHINITIS DUE TO POLLEN: ICD-10-CM

## 2021-06-14 RX ORDER — FLUTICASONE PROPIONATE 50 MCG
2 SPRAY, SUSPENSION (ML) NASAL DAILY
Qty: 11.1 ML | Refills: 3 | Status: SHIPPED | OUTPATIENT
Start: 2021-06-14

## 2021-06-14 RX ORDER — AZITHROMYCIN 250 MG/1
TABLET, FILM COATED ORAL
Qty: 6 TABLET | Refills: 0 | Status: SHIPPED | OUTPATIENT
Start: 2021-06-14 | End: 2021-07-09

## 2021-07-09 ENCOUNTER — OFFICE VISIT (OUTPATIENT)
Dept: CARDIOLOGY | Facility: CLINIC | Age: 51
End: 2021-07-09

## 2021-07-09 VITALS
OXYGEN SATURATION: 97 % | BODY MASS INDEX: 35 KG/M2 | DIASTOLIC BLOOD PRESSURE: 85 MMHG | HEART RATE: 90 BPM | WEIGHT: 250 LBS | SYSTOLIC BLOOD PRESSURE: 134 MMHG | HEIGHT: 71 IN

## 2021-07-09 DIAGNOSIS — R53.83 MALAISE AND FATIGUE: ICD-10-CM

## 2021-07-09 DIAGNOSIS — R07.9 CHEST PAIN, UNSPECIFIED TYPE: ICD-10-CM

## 2021-07-09 DIAGNOSIS — I10 ESSENTIAL HYPERTENSION: Primary | ICD-10-CM

## 2021-07-09 DIAGNOSIS — G47.33 OSA (OBSTRUCTIVE SLEEP APNEA): ICD-10-CM

## 2021-07-09 DIAGNOSIS — R53.81 MALAISE AND FATIGUE: ICD-10-CM

## 2021-07-09 PROCEDURE — 99214 OFFICE O/P EST MOD 30 MIN: CPT | Performed by: INTERNAL MEDICINE

## 2021-07-09 NOTE — PROGRESS NOTES
Date of Office Visit: 2021  Encounter Provider: Dr. Umer Jason    Place of Service: [facility] CARDIOLOGY [place]  Patient Name: Francesco Sam  :1970  Reyes Tubbs MD    Chief Complaint   Patient presents with   • Chest Pain     History of Present Illness    I am pleased to see Mr. Sam in my office today as a follow-up    As you know, patient is 51-year-old white gentleman whose past medical history significant for hypertension, hyperlipidemia, history of gout, who came today for follow-up    In May 2021, patient was presented to me for symptom of chest pain and also abnormal EKG which showed poor progression of heart rate.  Patient was recommended to have stress echocardiography.  In which he walked for total of 7 minutes and achieved target heart rate.  Echocardiographic images showed no myocardial ischemia or infarction.  LVEF was 60 to 65% on rest.  No significant valvular heart disease noted.    Since the discharge she is feeling better.0 he brought his blood pressure logbook and most of the blood pressure readings are within desirable range.  Patient denying any further episode of chest pain.  No orthopnea PND no syncope or presyncope.  No leg edema noted.    At this stage I would recommend that patient should proceed with current treatment.  I will see the patient in 1 year.  Risk factor modification recommended.  Patient is advised to follow-up with PCP for lipid panel testing for future.              Past Medical History:   Diagnosis Date   • Hyperlipidemia    • Hypertension          History reviewed. No pertinent surgical history.        Current Outpatient Medications:   •  allopurinol (ZYLOPRIM) 300 MG tablet, TAKE ONE TABLET BY MOUTH ONCE DAILY TO CONTROL URIC ACID, Disp: 90 tablet, Rfl: 3  •  aspirin 81 MG EC tablet, Take 81 mg by mouth Daily., Disp: , Rfl:   •  atorvastatin (LIPITOR) 20 MG tablet, Take 1 tablet by mouth Daily., Disp: 90 tablet, Rfl: 0  •  Cholecalciferol  (VITAMIN D3) 3000 units tablet, Take 1 tablet by mouth., Disp: , Rfl:   •  fenofibrate (TRICOR) 145 MG tablet, TAKE ONE TABLET BY MOUTH ONCE DAILY, Disp: 90 tablet, Rfl: 3  •  fluticasone (FLONASE) 50 MCG/ACT nasal spray, 2 sprays into the nostril(s) as directed by provider Daily., Disp: 11.1 mL, Rfl: 3  •  hydroCHLOROthiazide (HYDRODIURIL) 25 MG tablet, Take 1 tablet by mouth Daily., Disp: 30 tablet, Rfl: 2  •  lisinopril (PRINIVIL,ZESTRIL) 40 MG tablet, TAKE ONE TABLET BY MOUTH ONCE DAILY FOR BLOOD PRESSURE, Disp: 90 tablet, Rfl: 3  •  metoprolol succinate XL (TOPROL-XL) 100 MG 24 hr tablet, Take 1 tablet by mouth Daily., Disp: 30 tablet, Rfl: 2  •  NON FORMULARY, TESTboost has hawthorn berry, ginseng root, ashwagandha root, tribulus terrestris fruit, Disp: , Rfl:   •  Omega-3 Fatty Acids (FISH OIL OMEGA-3 PO), Take 1,400 mg by mouth., Disp: , Rfl:   •  sertraline (ZOLOFT) 50 MG tablet, TAKE ONE TABLET BY MOUTH ONCE DAILY, Disp: 30 tablet, Rfl: 11      Social History     Socioeconomic History   • Marital status:      Spouse name: Not on file   • Number of children: Not on file   • Years of education: Not on file   • Highest education level: Not on file   Tobacco Use   • Smoking status: Never Smoker   • Smokeless tobacco: Never Used   Vaping Use   • Vaping Use: Never used   Substance and Sexual Activity   • Alcohol use: Yes   • Drug use: No   • Sexual activity: Defer         Review of Systems   Constitutional: Negative for chills and fever.   HENT: Negative for ear discharge and nosebleeds.    Eyes: Negative for discharge and redness.   Cardiovascular: Negative for chest pain, orthopnea, palpitations, paroxysmal nocturnal dyspnea and syncope.   Respiratory: Negative for cough, shortness of breath and wheezing.    Endocrine: Negative for heat intolerance.   Skin: Negative for rash.   Musculoskeletal: Negative for arthritis and myalgias.   Gastrointestinal: Negative for abdominal pain, melena, nausea and  "vomiting.   Genitourinary: Negative for dysuria and hematuria.   Neurological: Negative for dizziness, light-headedness, numbness and tremors.   Psychiatric/Behavioral: Negative for depression. The patient is not nervous/anxious.        Procedures    Procedures    No orders to display           Objective:    /85   Pulse 90   Ht 179.1 cm (70.51\")   Wt 113 kg (250 lb)   SpO2 97%   BMI 35.35 kg/m²         Constitutional:       Appearance: Well-developed.   Eyes:      General: No scleral icterus.        Right eye: No discharge.   HENT:      Head: Normocephalic and atraumatic.   Neck:      Thyroid: No thyromegaly.      Lymphadenopathy: No cervical adenopathy.   Pulmonary:      Effort: Pulmonary effort is normal. No respiratory distress.      Breath sounds: Normal breath sounds. No wheezing. No rales.   Cardiovascular:      Normal rate. Regular rhythm.      No gallop.   Abdominal:      Tenderness: There is no abdominal tenderness.   Skin:     Findings: No erythema or rash.   Neurological:      Mental Status: Alert and oriented to person, place, and time.             Assessment:       Diagnosis Plan   1. Essential hypertension     2. VERONICA (obstructive sleep apnea)     3. Malaise and fatigue     4. Chest pain, unspecified type              Plan:       MDM:    1.  Hypertension:    Blood pressure is very well controlled    2.  Chest pain:    At this stage, I would recommend to continue to observe.  Stress echocardiographic is negative.    3.  Obstructive sleep apnea:    Patient uses BiPAP.  He is not following with any sleep specialist/pulmonologist.  I advised him to have a follow-up.  Patient not seen any specialist for last many many years  "

## 2021-07-15 DIAGNOSIS — M10.9 GOUT, UNSPECIFIED CAUSE, UNSPECIFIED CHRONICITY, UNSPECIFIED SITE: ICD-10-CM

## 2021-07-15 DIAGNOSIS — I10 HYPERTENSION, BENIGN: ICD-10-CM

## 2021-07-15 DIAGNOSIS — E78.5 HYPERLIPIDEMIA, UNSPECIFIED HYPERLIPIDEMIA TYPE: ICD-10-CM

## 2021-07-16 RX ORDER — FENOFIBRATE 145 MG/1
TABLET, COATED ORAL
Qty: 90 TABLET | Refills: 3 | Status: SHIPPED | OUTPATIENT
Start: 2021-07-16 | End: 2022-01-04

## 2021-07-16 RX ORDER — LISINOPRIL 40 MG/1
TABLET ORAL
Qty: 90 TABLET | Refills: 3 | Status: SHIPPED | OUTPATIENT
Start: 2021-07-16 | End: 2022-06-17

## 2021-07-16 RX ORDER — ALLOPURINOL 300 MG/1
TABLET ORAL
Qty: 90 TABLET | Refills: 3 | Status: SHIPPED | OUTPATIENT
Start: 2021-07-16 | End: 2022-01-04

## 2021-08-13 DIAGNOSIS — I10 HYPERTENSION, BENIGN: ICD-10-CM

## 2021-08-13 DIAGNOSIS — E78.5 HYPERLIPIDEMIA, UNSPECIFIED HYPERLIPIDEMIA TYPE: ICD-10-CM

## 2021-08-13 RX ORDER — HYDROCHLOROTHIAZIDE 25 MG/1
TABLET ORAL
Qty: 30 TABLET | Refills: 2 | Status: SHIPPED | OUTPATIENT
Start: 2021-08-13 | End: 2021-11-01

## 2021-08-13 RX ORDER — METOPROLOL SUCCINATE 100 MG/1
TABLET, EXTENDED RELEASE ORAL
Qty: 30 TABLET | Refills: 2 | Status: SHIPPED | OUTPATIENT
Start: 2021-08-13 | End: 2021-11-01

## 2021-08-13 RX ORDER — ATORVASTATIN CALCIUM 20 MG/1
TABLET, FILM COATED ORAL
Qty: 90 TABLET | Refills: 0 | Status: SHIPPED | OUTPATIENT
Start: 2021-08-13 | End: 2021-11-01

## 2021-11-01 DIAGNOSIS — E78.5 HYPERLIPIDEMIA, UNSPECIFIED HYPERLIPIDEMIA TYPE: ICD-10-CM

## 2021-11-01 DIAGNOSIS — I10 HYPERTENSION, BENIGN: ICD-10-CM

## 2021-11-01 RX ORDER — ATORVASTATIN CALCIUM 20 MG/1
TABLET, FILM COATED ORAL
Qty: 28 TABLET | Refills: 0 | Status: SHIPPED | OUTPATIENT
Start: 2021-11-01 | End: 2021-12-03

## 2021-11-01 RX ORDER — METOPROLOL SUCCINATE 100 MG/1
TABLET, EXTENDED RELEASE ORAL
Qty: 28 TABLET | Refills: 2 | Status: SHIPPED | OUTPATIENT
Start: 2021-11-01 | End: 2022-01-31

## 2021-11-01 RX ORDER — HYDROCHLOROTHIAZIDE 25 MG/1
TABLET ORAL
Qty: 28 TABLET | Refills: 2 | Status: SHIPPED | OUTPATIENT
Start: 2021-11-01 | End: 2022-01-31

## 2021-12-03 DIAGNOSIS — E78.5 HYPERLIPIDEMIA, UNSPECIFIED HYPERLIPIDEMIA TYPE: ICD-10-CM

## 2021-12-03 RX ORDER — ATORVASTATIN CALCIUM 20 MG/1
TABLET, FILM COATED ORAL
Qty: 28 TABLET | Refills: 0 | Status: SHIPPED | OUTPATIENT
Start: 2021-12-03 | End: 2022-01-04

## 2022-01-04 DIAGNOSIS — M10.9 GOUT, UNSPECIFIED CAUSE, UNSPECIFIED CHRONICITY, UNSPECIFIED SITE: ICD-10-CM

## 2022-01-04 DIAGNOSIS — E78.5 HYPERLIPIDEMIA, UNSPECIFIED HYPERLIPIDEMIA TYPE: ICD-10-CM

## 2022-01-04 RX ORDER — ALLOPURINOL 300 MG/1
TABLET ORAL
Qty: 28 TABLET | Refills: 3 | Status: SHIPPED | OUTPATIENT
Start: 2022-01-04 | End: 2022-04-20

## 2022-01-04 RX ORDER — ATORVASTATIN CALCIUM 20 MG/1
TABLET, FILM COATED ORAL
Qty: 28 TABLET | Refills: 0 | Status: SHIPPED | OUTPATIENT
Start: 2022-01-04 | End: 2022-01-31

## 2022-01-04 RX ORDER — FENOFIBRATE 145 MG/1
TABLET, COATED ORAL
Qty: 28 TABLET | Refills: 3 | Status: SHIPPED | OUTPATIENT
Start: 2022-01-04 | End: 2022-01-31

## 2022-01-31 DIAGNOSIS — I10 HYPERTENSION, BENIGN: ICD-10-CM

## 2022-01-31 DIAGNOSIS — E78.5 HYPERLIPIDEMIA, UNSPECIFIED HYPERLIPIDEMIA TYPE: ICD-10-CM

## 2022-01-31 RX ORDER — METOPROLOL SUCCINATE 100 MG/1
TABLET, EXTENDED RELEASE ORAL
Qty: 28 TABLET | Refills: 2 | Status: SHIPPED | OUTPATIENT
Start: 2022-01-31 | End: 2022-04-20

## 2022-01-31 RX ORDER — ATORVASTATIN CALCIUM 20 MG/1
TABLET, FILM COATED ORAL
Qty: 28 TABLET | Refills: 0 | Status: SHIPPED | OUTPATIENT
Start: 2022-01-31 | End: 2022-02-14 | Stop reason: SDUPTHER

## 2022-01-31 RX ORDER — FENOFIBRATE 145 MG/1
TABLET, COATED ORAL
Qty: 2 TABLET | Refills: 3 | Status: SHIPPED | OUTPATIENT
Start: 2022-01-31 | End: 2022-05-16

## 2022-01-31 RX ORDER — HYDROCHLOROTHIAZIDE 25 MG/1
TABLET ORAL
Qty: 28 TABLET | Refills: 2 | Status: SHIPPED | OUTPATIENT
Start: 2022-01-31 | End: 2022-04-20

## 2022-02-07 NOTE — PROGRESS NOTES
Bethanie Sam is a 52 y.o. male.     Chief Complaint   Patient presents with   • Annual Exam   • Hypertension   • Blood Sugar Problem   • Hyperlipidemia       The patient is here: to discuss health maintenance and disease prevention to follow up on multiple medical problems.  Last comprehensive physical was on 2/2/2021.  Previous physical was performed by  Reyes Tubbs MD  Overall has: minimal activity with work/home activities, good appetite, feels well with minor complaints, decreased energy level and is sleeping poorly. Nutrition: eating a variety of foods. Last tetanus shot was unknown. Patient's last stress test was: 6/5/2021 with  Patient's last PSA was: 2.6 on 7/21/2020    Hypertension  This is a chronic problem. The current episode started more than 1 year ago. The problem has been gradually worsening since onset. The problem is uncontrolled. Associated symptoms include anxiety, malaise/fatigue, orthopnea, PND, shortness of breath and sweats. Pertinent negatives include no blurred vision, chest pain, headaches, neck pain, palpitations or peripheral edema. Agents associated with hypertension include NSAIDs. Risk factors for coronary artery disease include dyslipidemia, family history, obesity, sedentary lifestyle and stress. Current antihypertension treatment includes ACE inhibitors, beta blockers and diuretics. There are no compliance problems.    Hyperlipidemia  This is a chronic problem. The current episode started more than 1 year ago. Recent lipid tests were reviewed and are high. Exacerbating diseases include obesity. He has no history of diabetes or hypothyroidism. Associated symptoms include shortness of breath. Pertinent negatives include no chest pain. Current antihyperlipidemic treatment includes statins and herbal therapy. There are no compliance problems.  Risk factors for coronary artery disease include dyslipidemia, hypertension, male sex and obesity.        Recent  Hospitalizations:  No hospitalization(s) within the last year..  ccc      I personally reviewed and updated the patient's allergies, medications, problem list, and past medical, surgical, social, and family history. I have reviewed and confirmed the accuracy of the HPI and ROS as documented by the MA/LPN/RN Reyes Tubbs MD    Family History   Problem Relation Age of Onset   • Heart disease Mother    • Hypertension Mother    • Stroke Mother    • Heart disease Father    • Lung cancer Paternal Uncle    • Gout Maternal Grandmother    • Hearing loss Maternal Grandfather    • Hypertension Maternal Grandfather        Social History     Tobacco Use   • Smoking status: Never Smoker   • Smokeless tobacco: Never Used   Vaping Use   • Vaping Use: Never used   Substance Use Topics   • Alcohol use: Yes   • Drug use: No       History reviewed. No pertinent surgical history.    Patient Active Problem List   Diagnosis   • Acute crisis reaction   • Chest pain   • Gout   • Headache   • Hemorrhoids   • Familial hypercholesterolemia   • Malaise and fatigue   • Nonorganic sleep disorder   • VERONICA (obstructive sleep apnea)   • Class 1 obesity due to excess calories with serious comorbidity and body mass index (BMI) of 34.0 to 34.9 in adult   • Annual visit for general adult medical examination with abnormal findings   • Screening for diabetes mellitus   • Seasonal allergic rhinitis due to pollen   • Abnormal EKG   • Essential hypertension   • Right wrist pain         Current Outpatient Medications:   •  aspirin 81 MG EC tablet, Take 81 mg by mouth Daily., Disp: , Rfl:   •  Cholecalciferol (VITAMIN D3) 3000 units tablet, Take 1 tablet by mouth., Disp: , Rfl:   •  fluticasone (FLONASE) 50 MCG/ACT nasal spray, 2 sprays into the nostril(s) as directed by provider Daily., Disp: 11.1 mL, Rfl: 3  •  NON FORMULARY, TESTboost has hawthorn berry, ginseng root, ashwagandha root, tribulus terrestris fruit, Disp: , Rfl:   •  Omega-3 Fatty Acids (FISH  "OIL OMEGA-3 PO), Take 1,400 mg by mouth., Disp: , Rfl:   •  sertraline (ZOLOFT) 50 MG tablet, TAKE ONE TABLET BY MOUTH ONCE DAILY, Disp: 30 tablet, Rfl: 11  •  allopurinol (ZYLOPRIM) 300 MG tablet, TAKE ONE TABLET BY MOUTH ONCE DAILY TO CONTROL URIC ACID, Disp: 28 tablet, Rfl: 3  •  atorvastatin (LIPITOR) 40 MG tablet, Take 1 tablet by mouth Daily., Disp: 90 tablet, Rfl: 3  •  fenofibrate (TRICOR) 145 MG tablet, TAKE ONE TABLET BY MOUTH ONCE DAILY, Disp: 28 tablet, Rfl: 3  •  hydroCHLOROthiazide (HYDRODIURIL) 25 MG tablet, TAKE ONE TABLET BY MOUTH ONCE DAILY, Disp: 28 tablet, Rfl: 2  •  lisinopril (PRINIVIL,ZESTRIL) 40 MG tablet, TAKE ONE TABLET BY MOUTH ONCE DAILY FOR BLOOD PRESSURE, Disp: 90 tablet, Rfl: 3  •  metoprolol succinate XL (TOPROL-XL) 100 MG 24 hr tablet, TAKE ONE TABLET BY MOUTH ONCE DAILY, Disp: 28 tablet, Rfl: 2    Review of Systems   Constitutional: Positive for malaise/fatigue. Negative for chills and diaphoresis.   HENT: Negative for trouble swallowing and voice change.    Eyes: Negative for blurred vision and visual disturbance.   Respiratory: Positive for shortness of breath.    Cardiovascular: Positive for orthopnea and PND. Negative for chest pain and palpitations.   Gastrointestinal: Negative for abdominal pain and nausea.   Endocrine: Negative for polydipsia and polyphagia.   Genitourinary: Negative for hematuria.   Musculoskeletal: Negative for neck pain and neck stiffness.   Skin: Negative for color change and pallor.   Allergic/Immunologic: Negative for immunocompromised state.   Neurological: Negative for seizures and syncope.   Hematological: Negative for adenopathy.   Psychiatric/Behavioral: Negative for hallucinations, sleep disturbance and suicidal ideas.       I have reviewed and confirmed the accuracy of the ROS as documented by the MA/LPN/RN Reyes Tubbs MD      Objective   /86   Pulse 103   Temp 98.2 °F (36.8 °C)   Resp 20   Ht 179.1 cm (70.5\")   Wt 113 kg (250 lb)  "  SpO2 99%   BMI 35.36 kg/m²   BP Readings from Last 3 Encounters:   07/08/22 127/83   04/22/22 120/80   02/08/22 122/86     Wt Readings from Last 3 Encounters:   07/08/22 116 kg (255 lb)   04/22/22 115 kg (254 lb 3.2 oz)   02/08/22 113 kg (250 lb)     Physical Exam  Constitutional:       Appearance: He is well-developed. He is not diaphoretic.   HENT:      Head: Normocephalic.      Right Ear: Tympanic membrane, ear canal and external ear normal.      Left Ear: Tympanic membrane, ear canal and external ear normal.      Nose: Nose normal.   Eyes:      General: Lids are normal.      Conjunctiva/sclera: Conjunctivae normal.      Pupils: Pupils are equal, round, and reactive to light.   Neck:      Thyroid: No thyromegaly.      Vascular: No carotid bruit or JVD.      Trachea: No tracheal deviation.   Cardiovascular:      Rate and Rhythm: Normal rate and regular rhythm.      Heart sounds: Normal heart sounds. No murmur heard.  No friction rub. No gallop.    Pulmonary:      Effort: Pulmonary effort is normal.      Breath sounds: Normal breath sounds. No stridor. No decreased breath sounds, wheezing or rales.   Abdominal:      General: Bowel sounds are normal. There is no distension.      Palpations: Abdomen is soft. There is no mass.      Tenderness: There is no abdominal tenderness. There is no guarding or rebound.      Hernia: No hernia is present.   Lymphadenopathy:      Head:      Right side of head: No submental, submandibular, tonsillar, preauricular, posterior auricular or occipital adenopathy.      Left side of head: No submental, submandibular, tonsillar, preauricular, posterior auricular or occipital adenopathy.      Cervical: No cervical adenopathy.   Skin:     General: Skin is warm and dry.      Coloration: Skin is not pale.   Neurological:      Mental Status: He is alert and oriented to person, place, and time.      Cranial Nerves: No cranial nerve deficit.      Sensory: No sensory deficit.      Coordination:  Coordination normal.      Gait: Gait normal.      Deep Tendon Reflexes: Reflexes are normal and symmetric.         Data / Lab Results:    Hemoglobin A1C   Date Value Ref Range Status   02/08/2022 5.5 % Final        Lab Results   Component Value Date     (H) 02/10/2022     (H) 02/02/2021     (H) 07/21/2020     Lab Results   Component Value Date    CHOL 211 (H) 02/12/2018    CHOL 177 01/03/2018    CHOL 246 (H) 03/22/2017     Lab Results   Component Value Date    TRIG 308 (H) 02/10/2022    TRIG 300 (H) 02/02/2021    TRIG 313 (H) 07/21/2020     Lab Results   Component Value Date    HDL 26 (L) 02/10/2022    HDL 28 (L) 02/02/2021    HDL 26 (L) 07/21/2020     Lab Results   Component Value Date    PSA 1.9 02/10/2022    PSA 2.6 07/21/2020     Lab Results   Component Value Date    WBC 9.7 02/10/2022    HGB 16.8 02/10/2022    HCT 48.3 02/10/2022    MCV 87 02/10/2022     02/10/2022     Lab Results   Component Value Date    TSH 3.170 02/10/2022      Lab Results   Component Value Date    GLUCOSE 92 02/10/2022    BUN 20 02/10/2022    CREATININE 1.28 (H) 02/10/2022    EGFRIFNONA 64 02/10/2022    EGFRIFAFRI 74 02/10/2022    BCR 16 02/10/2022    K 3.7 02/10/2022    CO2 21 02/10/2022    CALCIUM 10.2 02/10/2022    PROTENTOTREF 7.6 02/10/2022    ALBUMIN 4.5 02/10/2022    LABIL2 1.5 02/10/2022    AST 43 (H) 02/10/2022    ALT 42 02/10/2022     Lab Results   Component Value Date    BERNICE  02/13/2018     NEGATIVE This result is designed to aid in the diagnosis of many of the    BERNICE  02/13/2018     systemic autoimmune disorders and is not diagnostic by itself.  Test results    BERNICE  02/13/2018     should be interpreted in conjunction with the clinical evaluation of the    BERNICE  02/13/2018     patient.  SLE patients undergoing steroid therapy may have negative results.      Lab Results   Component Value Date    CRP 18.99 (H) 02/16/2018      No results found for: IRON, TIBC, FERRITIN   No results found for: [id number]      Age-appropriate Screening Schedule:  Refer to the list below for future screening recommendations based on patient's age, sex and/or medical conditions. Orders for these recommended tests are listed in the plan section. The patient has been provided with a written plan.    Health Maintenance   Topic Date Due   • TDAP/TD VACCINES (1 - Tdap) 02/08/2023 (Originally 1/2/1989)   • ZOSTER VACCINE (1 of 2) 02/20/2023 (Originally 1/2/2020)   • INFLUENZA VACCINE  10/01/2022   • PROSTATE CANCER SCREENING  02/10/2023   • LIPID PANEL  02/10/2023           Assessment & Plan      Medications        Problem List         LOS  Physical.  Doing well.  Recommend update tetanus vaccine through [facility].  Flu vaccine, pneumonia, Covid vaccine declined.    Discussed coated baby aspirin daily.  Discussed health maintenance, screening test, lifestyle modification.  Hypertension.      Much improved today on Rx.  Discussed low-sodium diet.  EKG with Q waves in inferior leads, no acute ST/T wave changes..   Has had cardiology eval per Dr. Jason, stress echocardiogram benign 2021. History of benign cardiac stress testing 2012.    Family history CVA, his mother.  Recommend carotid Dopplers he was to check with his insurance first.  Hyperlipidemia.  Much improved on atorvastatin, TriCor.  Restart fish oil. History of triglycerides greater than 1000.  Discussed diet, exercise, lifestyle modification.  Follow-up recheck  VERONICA.  He is attempting CPAP.  Recommend repeat sleep titration, he declined secondary to cost.  Gout.  Improved on allopurinol.  Follow-up recheck uric acid.  Depression.    Improved on sertraline.  Fatty liver.  Stable, mild elevation LFTs, continue fish oil, continue to monitor.  Elevated fasting blood sugar.      Improved today, , A1c 5.5.  History of 117/borderline.  Discussed diet, exercise, lifestyle modification.    Recheck fasting labs.  Prostate cancer screening.  PSA has been normal/recheck.  Colon cancer  screening.  Recommend colonoscopy/Cologuard he wants to check with his insurance first.  Seborrheic keratosis.  Multiple old face, benign appearance.  Topical care discussed.  Consider freezing if worsening symptoms.  Discussed sun protection.  Follow-up recheck.  Call return if worsening symptoms.      Diagnoses and all orders for this visit:    1. Annual visit for general adult medical examination with abnormal findings (Primary)    2. Essential hypertension  -     CBC & Differential  -     Comprehensive Metabolic Panel  -     TSH    3. Elevated fasting blood sugar  -     POCT Glucose  -     POC Glycosylated Hemoglobin (Hb A1C)    4. Class 1 obesity due to excess calories with serious comorbidity and body mass index (BMI) of 34.0 to 34.9 in adult  Assessment & Plan:  Patient's (Body mass index is 35.36 kg/m².) indicates that they are obese (BMI >30) with health conditions that include hypertension and dyslipidemias . Weight is unchanged. BMI is is above average; BMI management plan is completed. We discussed portion control and increasing exercise.       5. Class 2 severe obesity due to excess calories with serious comorbidity and body mass index (BMI) of 35.0 to 35.9 in adult (HCC)    6. Hyperlipidemia, unspecified hyperlipidemia type  -     Lipid Panel With / Chol / HDL Ratio    7. Screening for malignant neoplasm of prostate  -     PSA Screen    8. Gout, unspecified cause, unspecified chronicity, unspecified site  -     Uric Acid            Expected course, medications, and adverse effects discussed.  Call or return if worsening or persistent symptoms.  I wore protective equipment throughout this patient encounter including a mask, gloves, and eye protection.  Hand hygiene was performed before donning protective equipment and after removal when leaving the room. The complete contents of the Assessment and Plan and Data / Lab Results as documented above have been reviewed and addressed by myself with the patient  today as part of an ongoing evaluation / treatment plan.  If some of the documentation has been copied from a previous note and is unchanged it indicates that this problem / plan has been assessed today but is stable from a previous visit and no changes have been recommended.

## 2022-02-08 ENCOUNTER — OFFICE VISIT (OUTPATIENT)
Dept: FAMILY MEDICINE CLINIC | Facility: CLINIC | Age: 52
End: 2022-02-08

## 2022-02-08 VITALS
WEIGHT: 250 LBS | SYSTOLIC BLOOD PRESSURE: 122 MMHG | HEIGHT: 71 IN | HEART RATE: 103 BPM | BODY MASS INDEX: 35 KG/M2 | DIASTOLIC BLOOD PRESSURE: 86 MMHG | OXYGEN SATURATION: 99 % | TEMPERATURE: 98.2 F | RESPIRATION RATE: 20 BRPM

## 2022-02-08 DIAGNOSIS — Z12.5 SCREENING FOR MALIGNANT NEOPLASM OF PROSTATE: ICD-10-CM

## 2022-02-08 DIAGNOSIS — R73.01 ELEVATED FASTING BLOOD SUGAR: ICD-10-CM

## 2022-02-08 DIAGNOSIS — I10 ESSENTIAL HYPERTENSION: ICD-10-CM

## 2022-02-08 DIAGNOSIS — E66.09 CLASS 1 OBESITY DUE TO EXCESS CALORIES WITH SERIOUS COMORBIDITY AND BODY MASS INDEX (BMI) OF 34.0 TO 34.9 IN ADULT: ICD-10-CM

## 2022-02-08 DIAGNOSIS — E78.5 HYPERLIPIDEMIA, UNSPECIFIED HYPERLIPIDEMIA TYPE: ICD-10-CM

## 2022-02-08 DIAGNOSIS — E66.01 CLASS 2 SEVERE OBESITY DUE TO EXCESS CALORIES WITH SERIOUS COMORBIDITY AND BODY MASS INDEX (BMI) OF 35.0 TO 35.9 IN ADULT: ICD-10-CM

## 2022-02-08 DIAGNOSIS — M10.9 GOUT, UNSPECIFIED CAUSE, UNSPECIFIED CHRONICITY, UNSPECIFIED SITE: ICD-10-CM

## 2022-02-08 DIAGNOSIS — Z00.01 ANNUAL VISIT FOR GENERAL ADULT MEDICAL EXAMINATION WITH ABNORMAL FINDINGS: Primary | ICD-10-CM

## 2022-02-08 LAB
EXPIRATION DATE: NORMAL
GLUCOSE BLDC GLUCOMTR-MCNC: 103 MG/DL (ref 70–130)
HBA1C MFR BLD: 5.5 %
Lab: NORMAL

## 2022-02-08 PROCEDURE — 83036 HEMOGLOBIN GLYCOSYLATED A1C: CPT | Performed by: FAMILY MEDICINE

## 2022-02-08 PROCEDURE — 99396 PREV VISIT EST AGE 40-64: CPT | Performed by: FAMILY MEDICINE

## 2022-02-08 PROCEDURE — 99213 OFFICE O/P EST LOW 20 MIN: CPT | Performed by: FAMILY MEDICINE

## 2022-02-08 PROCEDURE — 82962 GLUCOSE BLOOD TEST: CPT | Performed by: FAMILY MEDICINE

## 2022-02-11 LAB
ALBUMIN SERPL-MCNC: 4.5 G/DL (ref 3.8–4.9)
ALBUMIN/GLOB SERPL: 1.5 {RATIO} (ref 1.2–2.2)
ALP SERPL-CCNC: 50 IU/L (ref 44–121)
ALT SERPL-CCNC: 42 IU/L (ref 0–44)
AST SERPL-CCNC: 43 IU/L (ref 0–40)
BASOPHILS # BLD AUTO: 0.1 X10E3/UL (ref 0–0.2)
BASOPHILS NFR BLD AUTO: 1 %
BILIRUB SERPL-MCNC: 0.4 MG/DL (ref 0–1.2)
BUN SERPL-MCNC: 20 MG/DL (ref 6–24)
BUN/CREAT SERPL: 16 (ref 9–20)
CALCIUM SERPL-MCNC: 10.2 MG/DL (ref 8.7–10.2)
CHLORIDE SERPL-SCNC: 99 MMOL/L (ref 96–106)
CHOLEST SERPL-MCNC: 206 MG/DL (ref 100–199)
CHOLEST/HDLC SERPL: 7.9 RATIO (ref 0–5)
CO2 SERPL-SCNC: 21 MMOL/L (ref 20–29)
CREAT SERPL-MCNC: 1.28 MG/DL (ref 0.76–1.27)
EOSINOPHIL # BLD AUTO: 0.2 X10E3/UL (ref 0–0.4)
EOSINOPHIL NFR BLD AUTO: 3 %
ERYTHROCYTE [DISTWIDTH] IN BLOOD BY AUTOMATED COUNT: 12.9 % (ref 11.6–15.4)
GLOBULIN SER CALC-MCNC: 3.1 G/DL (ref 1.5–4.5)
GLUCOSE SERPL-MCNC: 92 MG/DL (ref 65–99)
HCT VFR BLD AUTO: 48.3 % (ref 37.5–51)
HDLC SERPL-MCNC: 26 MG/DL
HGB BLD-MCNC: 16.8 G/DL (ref 13–17.7)
IMM GRANULOCYTES # BLD AUTO: 0.1 X10E3/UL (ref 0–0.1)
IMM GRANULOCYTES NFR BLD AUTO: 1 %
LDLC SERPL CALC-MCNC: 125 MG/DL (ref 0–99)
LYMPHOCYTES # BLD AUTO: 3.7 X10E3/UL (ref 0.7–3.1)
LYMPHOCYTES NFR BLD AUTO: 38 %
MCH RBC QN AUTO: 30.3 PG (ref 26.6–33)
MCHC RBC AUTO-ENTMCNC: 34.8 G/DL (ref 31.5–35.7)
MCV RBC AUTO: 87 FL (ref 79–97)
MONOCYTES # BLD AUTO: 0.6 X10E3/UL (ref 0.1–0.9)
MONOCYTES NFR BLD AUTO: 6 %
NEUTROPHILS # BLD AUTO: 5 X10E3/UL (ref 1.4–7)
NEUTROPHILS NFR BLD AUTO: 51 %
PLATELET # BLD AUTO: 252 X10E3/UL (ref 150–450)
POTASSIUM SERPL-SCNC: 3.7 MMOL/L (ref 3.5–5.2)
PROT SERPL-MCNC: 7.6 G/DL (ref 6–8.5)
PSA SERPL-MCNC: 1.9 NG/ML (ref 0–4)
RBC # BLD AUTO: 5.55 X10E6/UL (ref 4.14–5.8)
SODIUM SERPL-SCNC: 142 MMOL/L (ref 134–144)
TRIGL SERPL-MCNC: 308 MG/DL (ref 0–149)
TSH SERPL DL<=0.005 MIU/L-ACNC: 3.17 UIU/ML (ref 0.45–4.5)
URATE SERPL-MCNC: 6.2 MG/DL (ref 3.8–8.4)
VLDLC SERPL CALC-MCNC: 55 MG/DL (ref 5–40)
WBC # BLD AUTO: 9.7 X10E3/UL (ref 3.4–10.8)

## 2022-02-14 DIAGNOSIS — E78.5 HYPERLIPIDEMIA, UNSPECIFIED HYPERLIPIDEMIA TYPE: ICD-10-CM

## 2022-02-14 RX ORDER — ATORVASTATIN CALCIUM 40 MG/1
40 TABLET, FILM COATED ORAL DAILY
Qty: 90 TABLET | Refills: 3 | Status: SHIPPED | OUTPATIENT
Start: 2022-02-14 | End: 2023-01-27

## 2022-04-20 DIAGNOSIS — I10 HYPERTENSION, BENIGN: ICD-10-CM

## 2022-04-20 DIAGNOSIS — M10.9 GOUT, UNSPECIFIED CAUSE, UNSPECIFIED CHRONICITY, UNSPECIFIED SITE: ICD-10-CM

## 2022-04-20 RX ORDER — METOPROLOL SUCCINATE 100 MG/1
TABLET, EXTENDED RELEASE ORAL
Qty: 28 TABLET | Refills: 2 | Status: SHIPPED | OUTPATIENT
Start: 2022-04-20 | End: 2022-07-13

## 2022-04-20 RX ORDER — HYDROCHLOROTHIAZIDE 25 MG/1
TABLET ORAL
Qty: 28 TABLET | Refills: 2 | Status: SHIPPED | OUTPATIENT
Start: 2022-04-20 | End: 2022-07-13

## 2022-04-20 RX ORDER — ALLOPURINOL 300 MG/1
TABLET ORAL
Qty: 28 TABLET | Refills: 3 | Status: SHIPPED | OUTPATIENT
Start: 2022-04-20 | End: 2022-08-08

## 2022-04-22 ENCOUNTER — OFFICE VISIT (OUTPATIENT)
Dept: FAMILY MEDICINE CLINIC | Facility: CLINIC | Age: 52
End: 2022-04-22

## 2022-04-22 VITALS
BODY MASS INDEX: 35.59 KG/M2 | RESPIRATION RATE: 18 BRPM | OXYGEN SATURATION: 97 % | WEIGHT: 254.2 LBS | DIASTOLIC BLOOD PRESSURE: 80 MMHG | TEMPERATURE: 97.5 F | SYSTOLIC BLOOD PRESSURE: 120 MMHG | HEART RATE: 91 BPM | HEIGHT: 71 IN

## 2022-04-22 DIAGNOSIS — M77.9 TENDONITIS: ICD-10-CM

## 2022-04-22 DIAGNOSIS — M25.531 RIGHT WRIST PAIN: Primary | ICD-10-CM

## 2022-04-22 PROBLEM — E66.9 OBESITY: Status: RESOLVED | Noted: 2019-07-23 | Resolved: 2022-04-22

## 2022-04-22 PROCEDURE — 99212 OFFICE O/P EST SF 10 MIN: CPT | Performed by: FAMILY MEDICINE

## 2022-04-22 NOTE — PROGRESS NOTES
Bethanie Sam is a 52 y.o. male.   Chief Complaint   Patient presents with   • Wrist Pain       Wrist Pain   The pain is present in the right wrist and right fingers. This is a new problem. The current episode started in the past 7 days. The problem occurs intermittently. The quality of the pain is described as aching. Associated symptoms include tingling. Pertinent negatives include no fever or itching. The symptoms are aggravated by activity. He has tried acetaminophen for the symptoms. The treatment provided no relief.        The following portions of the patient's history were reviewed and updated as appropriate: allergies, current medications, past family history, past medical history, past social history, past surgical history and problem list.    Patient Active Problem List   Diagnosis   • Acute crisis reaction   • Chest pain   • Gout   • Headache   • Hemorrhoids   • Familial hypercholesterolemia   • Malaise and fatigue   • Nonorganic sleep disorder   • VERONICA (obstructive sleep apnea)   • Class 1 obesity due to excess calories with serious comorbidity and body mass index (BMI) of 34.0 to 34.9 in adult   • Annual visit for general adult medical examination with abnormal findings   • Screening for diabetes mellitus   • Seasonal allergic rhinitis due to pollen   • Abnormal EKG   • Essential hypertension   • Right wrist pain       Current Outpatient Medications on File Prior to Visit   Medication Sig Dispense Refill   • allopurinol (ZYLOPRIM) 300 MG tablet TAKE ONE TABLET BY MOUTH ONCE DAILY TO CONTROL URIC ACID 28 tablet 3   • aspirin 81 MG EC tablet Take 81 mg by mouth Daily.     • atorvastatin (LIPITOR) 40 MG tablet Take 1 tablet by mouth Daily. 90 tablet 3   • Cholecalciferol (VITAMIN D3) 3000 units tablet Take 1 tablet by mouth.     • fenofibrate (TRICOR) 145 MG tablet TAKE ONE TABLET BY MOUTH ONCE DAILY 2 tablet 3   • fluticasone (FLONASE) 50 MCG/ACT nasal spray 2 sprays into the nostril(s) as  "directed by provider Daily. 11.1 mL 3   • hydroCHLOROthiazide (HYDRODIURIL) 25 MG tablet TAKE ONE TABLET BY MOUTH ONCE DAILY 28 tablet 2   • lisinopril (PRINIVIL,ZESTRIL) 40 MG tablet TAKE ONE TABLET BY MOUTH ONCE DAILY FOR BLOOD PRESSURE 90 tablet 3   • metoprolol succinate XL (TOPROL-XL) 100 MG 24 hr tablet TAKE ONE TABLET BY MOUTH ONCE DAILY 28 tablet 2   • NON FORMULARY TESTboost has hawthorn berry, ginseng root, ashwagandha root, tribulus terrestris fruit     • Omega-3 Fatty Acids (FISH OIL OMEGA-3 PO) Take 1,400 mg by mouth.     • sertraline (ZOLOFT) 50 MG tablet TAKE ONE TABLET BY MOUTH ONCE DAILY 30 tablet 11     No current facility-administered medications on file prior to visit.     Current outpatient and discharge medications have been reconciled for the patient.  Reviewed by: Miquel Joyce MD      Allergies   Allergen Reactions   • Neomycin Rash       Review of Systems   Constitutional: Negative for fever.   Musculoskeletal: Positive for myalgias (right ulnar wrist/distal forearm).   Skin: Negative for itching.   Neurological: Positive for tingling.   All other systems reviewed and are negative.    I have reviewed and confirmed the accuracy of the ROS as documented by the MA/LPN/RN Miquel Joyce MD    Objective   Visit Vitals  /80 (BP Location: Right arm, Patient Position: Sitting, Cuff Size: Adult)   Pulse 91   Temp 97.5 °F (36.4 °C)   Resp 18   Ht 179.1 cm (70.5\")   Wt 115 kg (254 lb 3.2 oz)   SpO2 97%   BMI 35.96 kg/m²       Physical Exam  Constitutional:       Appearance: He is well-developed.   HENT:      Head: Normocephalic and atraumatic.      Right Ear: External ear normal.      Left Ear: External ear normal.      Nose: Nose normal.   Eyes:      Pupils: Pupils are equal, round, and reactive to light.   Cardiovascular:      Rate and Rhythm: Normal rate.      Heart sounds: Normal heart sounds.   Pulmonary:      Effort: Pulmonary effort is normal.      Breath sounds: Normal " breath sounds.   Abdominal:      General: Bowel sounds are normal.      Palpations: Abdomen is soft.   Musculoskeletal:         General: Normal range of motion.      Cervical back: Normal range of motion and neck supple.      Comments: Tender distal forearm soft tissue right. No bony tenderness, no snuffbox tenderness. Neg tinel. No numbness, tingling, or evidence for neurovascular compromise.    Skin:     General: Skin is warm and dry.   Neurological:      Mental Status: He is alert and oriented to person, place, and time.   Psychiatric:         Behavior: Behavior normal.         Thought Content: Thought content normal.         Judgment: Judgment normal.       Derm Physical Exam    Assessment/Plan .    Diagnoses and all orders for this visit:    1. Right wrist pain (Primary)    2. Tendonitis  Comments:  right forearm       Findings discussed. All questions answered.  Reassurance, education.  Natural course and self-limited nature of this condition discussed.  OTC NSAIDS discussed   Follow-up in 4-6 weeks if not better.  Follow-up sooner for worsening symptoms or for any concerns.       I wore protective equipment throughout this patient encounter to include mask and gloves. Hand hygiene was performed before donning protective equipment and after removal when leaving the room

## 2022-05-16 DIAGNOSIS — E78.5 HYPERLIPIDEMIA, UNSPECIFIED HYPERLIPIDEMIA TYPE: ICD-10-CM

## 2022-05-16 RX ORDER — FENOFIBRATE 145 MG/1
TABLET, COATED ORAL
Qty: 28 TABLET | Refills: 3 | Status: SHIPPED | OUTPATIENT
Start: 2022-05-16 | End: 2022-09-09

## 2022-06-15 DIAGNOSIS — I10 HYPERTENSION, BENIGN: ICD-10-CM

## 2022-06-17 RX ORDER — LISINOPRIL 40 MG/1
TABLET ORAL
Qty: 90 TABLET | Refills: 3 | Status: SHIPPED | OUTPATIENT
Start: 2022-06-17 | End: 2023-02-21 | Stop reason: SDUPTHER

## 2022-07-07 NOTE — PROGRESS NOTES
Date of Office Visit: 2022  Encounter Provider: Dr. Umre Jason  Place of Service: [facility] CARDIOLOGY [place]  Patient Name: Francesco Sam  :1970  Reyes Tubbs MD    Chief Complaint   Patient presents with   • Hypertension   • Follow-up     History of Present Illness    I am pleased to see Mr. Sam in my office today as a follow-up    As you know, patient is 52-year-old white gentleman whose past medical history significant for hypertension, hyperlipidemia, history of gout, who came today for follow-up    In May 2021, patient was presented to me for symptom of chest pain and also abnormal EKG which showed poor progression of heart rate.  Patient was recommended to have stress echocardiography.  In which he walked for total of 7 minutes and achieved target heart rate.  Echocardiographic images showed no myocardial ischemia or infarction.  LVEF was 60 to 65% on rest.  No significant valvular heart disease noted.    This is a yearly follow-up for the patient.  Patient is doing well.  Patient is trying to adjust his lifestyle.  He walks almost every day.  Patient denies any chest pain or tightness or heaviness.  No orthopnea PND no syncope or presyncope.  No leg edema noted.  No dizziness or lightheadedness no palpitation.    EKG showed normal sinus rhythm.  Inferior small Q waves were noted.    At this stage, patient is clinically doing well.  I would recommend risk factor modification.  Patient is advised to do aerobic exercises in which his heart rate increases.  Patient LDL is still high but it is much better than before.  I would recommend to increase Lipitor to 80 mg daily.  He wants to follow-up with PCP for further recommendation.        Past Medical History:   Diagnosis Date   • Hyperlipidemia    • Hypertension          History reviewed. No pertinent surgical history.        Current Outpatient Medications:   •  allopurinol (ZYLOPRIM) 300 MG tablet, TAKE ONE TABLET BY MOUTH ONCE  DAILY TO CONTROL URIC ACID, Disp: 28 tablet, Rfl: 3  •  aspirin 81 MG EC tablet, Take 81 mg by mouth Daily., Disp: , Rfl:   •  atorvastatin (LIPITOR) 40 MG tablet, Take 1 tablet by mouth Daily., Disp: 90 tablet, Rfl: 3  •  Cholecalciferol (VITAMIN D3) 3000 units tablet, Take 1 tablet by mouth., Disp: , Rfl:   •  fenofibrate (TRICOR) 145 MG tablet, TAKE ONE TABLET BY MOUTH ONCE DAILY, Disp: 28 tablet, Rfl: 3  •  fluticasone (FLONASE) 50 MCG/ACT nasal spray, 2 sprays into the nostril(s) as directed by provider Daily., Disp: 11.1 mL, Rfl: 3  •  hydroCHLOROthiazide (HYDRODIURIL) 25 MG tablet, TAKE ONE TABLET BY MOUTH ONCE DAILY, Disp: 28 tablet, Rfl: 2  •  lisinopril (PRINIVIL,ZESTRIL) 40 MG tablet, TAKE ONE TABLET BY MOUTH ONCE DAILY FOR BLOOD PRESSURE, Disp: 90 tablet, Rfl: 3  •  metoprolol succinate XL (TOPROL-XL) 100 MG 24 hr tablet, TAKE ONE TABLET BY MOUTH ONCE DAILY, Disp: 28 tablet, Rfl: 2  •  NON FORMULARY, TESTboost has hawthorn berry, ginseng root, ashwagandha root, tribulus terrestris fruit, Disp: , Rfl:   •  Omega-3 Fatty Acids (FISH OIL OMEGA-3 PO), Take 1,400 mg by mouth., Disp: , Rfl:   •  sertraline (ZOLOFT) 50 MG tablet, TAKE ONE TABLET BY MOUTH ONCE DAILY, Disp: 30 tablet, Rfl: 11      Social History     Socioeconomic History   • Marital status:    Tobacco Use   • Smoking status: Never Smoker   • Smokeless tobacco: Never Used   Vaping Use   • Vaping Use: Never used   Substance and Sexual Activity   • Alcohol use: Yes   • Drug use: No   • Sexual activity: Defer         Review of Systems   Constitutional: Negative for chills and fever.   HENT: Negative for ear discharge and nosebleeds.    Eyes: Negative for discharge and redness.   Cardiovascular: Negative for chest pain, orthopnea, palpitations, paroxysmal nocturnal dyspnea and syncope.   Respiratory: Negative for cough, shortness of breath and wheezing.    Endocrine: Negative for heat intolerance.   Skin: Negative for rash.   Musculoskeletal:  "Negative for arthritis and myalgias.   Gastrointestinal: Negative for abdominal pain, melena, nausea and vomiting.   Genitourinary: Negative for dysuria and hematuria.   Neurological: Negative for dizziness, light-headedness, numbness and tremors.   Psychiatric/Behavioral: Negative for depression. The patient is not nervous/anxious.        Procedures      ECG 12 Lead    Date/Time: 7/8/2022 9:27 AM  Performed by: Umer Jason MD  Authorized by: Umer Jason MD   Comparison: compared with previous ECG   Similar to previous ECG  Rhythm: sinus rhythm    Clinical impression: normal ECG            ECG 12 Lead    (Results Pending)           Objective:    /83 (BP Location: Right arm, Patient Position: Sitting, Cuff Size: Large Adult)   Pulse 83   Ht 179.1 cm (70.51\")   Wt 116 kg (255 lb)   BMI 36.06 kg/m²         Constitutional:       Appearance: Well-developed.   Eyes:      General: No scleral icterus.        Right eye: No discharge.   HENT:      Head: Normocephalic and atraumatic.   Neck:      Thyroid: No thyromegaly.      Lymphadenopathy: No cervical adenopathy.   Pulmonary:      Effort: Pulmonary effort is normal. No respiratory distress.      Breath sounds: Normal breath sounds. No wheezing. No rales.   Cardiovascular:      Normal rate. Regular rhythm.      No gallop.   Abdominal:      Tenderness: There is no abdominal tenderness.   Skin:     Findings: No erythema or rash.   Neurological:      Mental Status: Alert and oriented to person, place, and time.             Assessment:       Diagnosis Plan   1. Essential hypertension  ECG 12 Lead   2. Mixed hyperlipidemia              Plan:       MDM:    1.  Hypertension:    His blood pressure is controlled current treatment with lisinopril and metoprolol would be continued    2.  Hyperlipidemia:    Patient is on fenofibrate and Lipitor.  I would recommend that patient should proceed with current treatment.  However LDL is still high I would recommend increasing " Lipitor to 80 mg daily.  Patient will discuss with PCP    3.  History of premature CAD:    Patient definitely is at risk for premature coronary artery disease but his stress test has been negative previously recommend observation.  I would recommend that patient should have another stress test in 3 to 5 years.  I will see the patient as needed

## 2022-07-08 ENCOUNTER — OFFICE VISIT (OUTPATIENT)
Dept: CARDIOLOGY | Facility: CLINIC | Age: 52
End: 2022-07-08

## 2022-07-08 VITALS
WEIGHT: 255 LBS | BODY MASS INDEX: 35.7 KG/M2 | HEIGHT: 71 IN | SYSTOLIC BLOOD PRESSURE: 127 MMHG | DIASTOLIC BLOOD PRESSURE: 83 MMHG | HEART RATE: 83 BPM

## 2022-07-08 DIAGNOSIS — E78.2 MIXED HYPERLIPIDEMIA: ICD-10-CM

## 2022-07-08 DIAGNOSIS — I10 ESSENTIAL HYPERTENSION: Primary | ICD-10-CM

## 2022-07-08 PROCEDURE — 93000 ELECTROCARDIOGRAM COMPLETE: CPT | Performed by: INTERNAL MEDICINE

## 2022-07-08 PROCEDURE — 99213 OFFICE O/P EST LOW 20 MIN: CPT | Performed by: INTERNAL MEDICINE

## 2022-07-13 DIAGNOSIS — I10 HYPERTENSION, BENIGN: ICD-10-CM

## 2022-07-13 RX ORDER — HYDROCHLOROTHIAZIDE 25 MG/1
TABLET ORAL
Qty: 28 TABLET | Refills: 2 | Status: SHIPPED | OUTPATIENT
Start: 2022-07-13 | End: 2022-10-05

## 2022-07-13 RX ORDER — METOPROLOL SUCCINATE 100 MG/1
TABLET, EXTENDED RELEASE ORAL
Qty: 28 TABLET | Refills: 2 | Status: SHIPPED | OUTPATIENT
Start: 2022-07-13 | End: 2022-10-05

## 2022-08-08 DIAGNOSIS — M10.9 GOUT, UNSPECIFIED CAUSE, UNSPECIFIED CHRONICITY, UNSPECIFIED SITE: ICD-10-CM

## 2022-08-08 RX ORDER — ALLOPURINOL 300 MG/1
TABLET ORAL
Qty: 28 TABLET | Refills: 3 | Status: SHIPPED | OUTPATIENT
Start: 2022-08-08 | End: 2022-12-05

## 2022-09-08 DIAGNOSIS — E78.5 HYPERLIPIDEMIA, UNSPECIFIED HYPERLIPIDEMIA TYPE: ICD-10-CM

## 2022-09-09 RX ORDER — FENOFIBRATE 145 MG/1
TABLET, COATED ORAL
Qty: 30 TABLET | Refills: 3 | Status: SHIPPED | OUTPATIENT
Start: 2022-09-09 | End: 2022-12-30

## 2022-10-05 DIAGNOSIS — I10 HYPERTENSION, BENIGN: ICD-10-CM

## 2022-10-05 RX ORDER — METOPROLOL SUCCINATE 100 MG/1
TABLET, EXTENDED RELEASE ORAL
Qty: 28 TABLET | Refills: 2 | Status: SHIPPED | OUTPATIENT
Start: 2022-10-05 | End: 2022-12-30

## 2022-10-05 RX ORDER — HYDROCHLOROTHIAZIDE 25 MG/1
TABLET ORAL
Qty: 28 TABLET | Refills: 2 | Status: SHIPPED | OUTPATIENT
Start: 2022-10-05 | End: 2022-12-30

## 2022-12-05 DIAGNOSIS — M10.9 GOUT, UNSPECIFIED CAUSE, UNSPECIFIED CHRONICITY, UNSPECIFIED SITE: ICD-10-CM

## 2022-12-05 RX ORDER — ALLOPURINOL 300 MG/1
TABLET ORAL
Qty: 28 TABLET | Refills: 3 | Status: SHIPPED | OUTPATIENT
Start: 2022-12-05 | End: 2023-02-21 | Stop reason: SDUPTHER

## 2022-12-29 DIAGNOSIS — I10 HYPERTENSION, BENIGN: ICD-10-CM

## 2022-12-29 DIAGNOSIS — E78.5 HYPERLIPIDEMIA, UNSPECIFIED HYPERLIPIDEMIA TYPE: ICD-10-CM

## 2022-12-30 RX ORDER — HYDROCHLOROTHIAZIDE 25 MG/1
TABLET ORAL
Qty: 28 TABLET | Refills: 2 | Status: SHIPPED | OUTPATIENT
Start: 2022-12-30 | End: 2023-02-21 | Stop reason: SDUPTHER

## 2022-12-30 RX ORDER — METOPROLOL SUCCINATE 100 MG/1
TABLET, EXTENDED RELEASE ORAL
Qty: 28 TABLET | Refills: 2 | Status: SHIPPED | OUTPATIENT
Start: 2022-12-30 | End: 2023-02-21 | Stop reason: SDUPTHER

## 2022-12-30 RX ORDER — FENOFIBRATE 145 MG/1
TABLET, COATED ORAL
Qty: 30 TABLET | Refills: 3 | Status: SHIPPED | OUTPATIENT
Start: 2022-12-30 | End: 2023-02-21 | Stop reason: SDUPTHER

## 2023-01-26 DIAGNOSIS — E78.5 HYPERLIPIDEMIA, UNSPECIFIED HYPERLIPIDEMIA TYPE: ICD-10-CM

## 2023-01-27 RX ORDER — ATORVASTATIN CALCIUM 40 MG/1
TABLET, FILM COATED ORAL
Qty: 90 TABLET | Refills: 3 | Status: SHIPPED | OUTPATIENT
Start: 2023-01-27 | End: 2023-02-21 | Stop reason: SDUPTHER

## 2023-02-20 NOTE — PROGRESS NOTES
Bethanie Sam is a 53 y.o. male.     Chief Complaint   Patient presents with   • Annual Exam   • Hypertension   • Hyperlipidemia       The patient is here: to discuss health maintenance and disease prevention.  Last comprehensive physical was on 2/8/2022.  Previous physical was performed by  Reyes Tubbs MD  Overall has: minimal activity with work/home activities, good appetite, feels well with minor complaints, decreased energy level, is sleeping well and returned to full activity. Nutrition: appropriate balanced diet, balanced diet and eating a variety of foods. Last tetanus shot was unknown. Patient's last stress test was: 6/5/2021 Patient's last PSA was: 1.9 on 2/10/2022    Hypertension  This is a chronic problem. The current episode started more than 1 year ago. The problem has been gradually worsening since onset. The problem is uncontrolled. Associated symptoms include anxiety, malaise/fatigue, orthopnea, PND, shortness of breath and sweats. Pertinent negatives include no blurred vision, chest pain, headaches, neck pain, palpitations or peripheral edema. Agents associated with hypertension include NSAIDs. Risk factors for coronary artery disease include dyslipidemia, family history, obesity, sedentary lifestyle and stress. Current antihypertension treatment includes ACE inhibitors, beta blockers and diuretics. There are no compliance problems.    Hyperlipidemia  This is a chronic problem. The current episode started more than 1 year ago. Recent lipid tests were reviewed and are high. Exacerbating diseases include obesity. He has no history of diabetes or hypothyroidism. Associated symptoms include shortness of breath. Pertinent negatives include no chest pain. Current antihyperlipidemic treatment includes statins and herbal therapy. There are no compliance problems.  Risk factors for coronary artery disease include dyslipidemia, hypertension, male sex and obesity.        Recent  Hospitalizations:  No hospitalization(s) within the last year..  ccc      I personally reviewed and updated the patient's allergies, medications, problem list, and past medical, surgical, social, and family history. I have reviewed and confirmed the accuracy of the HPI and ROS as documented by the MA/LPN/RN Reyes Tubbs MD    Family History   Problem Relation Age of Onset   • Heart disease Mother    • Hypertension Mother    • Stroke Mother    • Heart disease Father    • Lung cancer Paternal Uncle    • Gout Maternal Grandmother    • Hearing loss Maternal Grandfather    • Hypertension Maternal Grandfather        Social History     Tobacco Use   • Smoking status: Never   • Smokeless tobacco: Never   Vaping Use   • Vaping Use: Never used   Substance Use Topics   • Alcohol use: Yes   • Drug use: No       History reviewed. No pertinent surgical history.    Patient Active Problem List   Diagnosis   • Acute crisis reaction   • Chest pain   • Gout   • Headache   • Hemorrhoids   • Familial hypercholesterolemia   • Malaise and fatigue   • Nonorganic sleep disorder   • VEROINCA (obstructive sleep apnea)   • Class 2 severe obesity due to excess calories with serious comorbidity and body mass index (BMI) of 35.0 to 35.9 in adult (HCC)   • Annual visit for general adult medical examination with abnormal findings   • Screening for diabetes mellitus   • Seasonal allergic rhinitis due to pollen   • Abnormal EKG   • Essential hypertension   • Right wrist pain         Current Outpatient Medications:   •  allopurinol (ZYLOPRIM) 300 MG tablet, Take 1 tablet by mouth Daily., Disp: 90 tablet, Rfl: 3  •  aspirin 81 MG EC tablet, Take 81 mg by mouth Daily., Disp: , Rfl:   •  atorvastatin (LIPITOR) 40 MG tablet, Take 1 tablet by mouth Daily., Disp: 90 tablet, Rfl: 3  •  Cholecalciferol (VITAMIN D3) 3000 units tablet, Take 1 tablet by mouth., Disp: , Rfl:   •  fenofibrate (TRICOR) 145 MG tablet, Take 1 tablet by mouth Daily., Disp: 90 tablet,  "Rfl: 3  •  fluticasone (FLONASE) 50 MCG/ACT nasal spray, 2 sprays into the nostril(s) as directed by provider Daily., Disp: 11.1 mL, Rfl: 3  •  hydroCHLOROthiazide (HYDRODIURIL) 25 MG tablet, Take 1 tablet by mouth Daily., Disp: 90 tablet, Rfl: 3  •  lisinopril (PRINIVIL,ZESTRIL) 40 MG tablet, Take 1 tablet by mouth Daily., Disp: 90 tablet, Rfl: 3  •  metoprolol succinate XL (TOPROL-XL) 100 MG 24 hr tablet, Take 1 tablet by mouth Daily., Disp: 90 tablet, Rfl: 3  •  NON FORMULARY, TESTboost has hawthorn berry, ginseng root, ashwagandha root, tribulus terrestris fruit, Disp: , Rfl:   •  Omega-3 Fatty Acids (FISH OIL OMEGA-3 PO), Take 1,400 mg by mouth., Disp: , Rfl:   •  sertraline (ZOLOFT) 50 MG tablet, Take 1 tablet by mouth Daily., Disp: 90 tablet, Rfl: 3    Review of Systems   Constitutional: Positive for malaise/fatigue. Negative for chills and diaphoresis.   Eyes: Negative for blurred vision and visual disturbance.   Respiratory: Positive for shortness of breath.    Cardiovascular: Positive for orthopnea and PND. Negative for chest pain and palpitations.   Gastrointestinal: Negative for abdominal pain and nausea.   Endocrine: Negative for polydipsia and polyphagia.   Musculoskeletal: Negative for neck pain and neck stiffness.   Skin: Negative for color change and pallor.   Neurological: Negative for seizures and syncope.   Hematological: Negative for adenopathy.   Psychiatric/Behavioral: Negative for hallucinations and suicidal ideas.       I have reviewed and confirmed the accuracy of the ROS as documented by the MA/LPN/RN Reyes Tubbs MD      Objective   /82 (BP Location: Left arm, Patient Position: Sitting, Cuff Size: Adult)   Pulse 88   Temp 98.9 °F (37.2 °C)   Resp 16   Ht 179.1 cm (70.51\")   Wt 112 kg (247 lb 12.8 oz)   SpO2 93%   BMI 35.04 kg/m²   BP Readings from Last 3 Encounters:   02/21/23 132/82   07/08/22 127/83   04/22/22 120/80     Wt Readings from Last 3 Encounters:   02/21/23 112 " kg (247 lb 12.8 oz)   07/08/22 116 kg (255 lb)   04/22/22 115 kg (254 lb 3.2 oz)     Physical Exam  Constitutional:       Appearance: He is well-developed. He is not diaphoretic.   HENT:      Head: Normocephalic.      Right Ear: Tympanic membrane, ear canal and external ear normal.      Left Ear: Tympanic membrane, ear canal and external ear normal.      Nose: Nose normal.   Eyes:      General: Lids are normal.      Conjunctiva/sclera: Conjunctivae normal.      Pupils: Pupils are equal, round, and reactive to light.   Neck:      Thyroid: No thyromegaly.      Vascular: No carotid bruit or JVD.      Trachea: No tracheal deviation.   Cardiovascular:      Rate and Rhythm: Normal rate and regular rhythm.      Heart sounds: Normal heart sounds. No murmur heard.    No friction rub. No gallop.   Pulmonary:      Effort: Pulmonary effort is normal.      Breath sounds: Normal breath sounds. No stridor. No decreased breath sounds, wheezing or rales.   Abdominal:      General: Bowel sounds are normal. There is no distension.      Palpations: Abdomen is soft. There is no mass.      Tenderness: There is no abdominal tenderness. There is no guarding or rebound.      Hernia: No hernia is present.   Lymphadenopathy:      Head:      Right side of head: No submental, submandibular, tonsillar, preauricular, posterior auricular or occipital adenopathy.      Left side of head: No submental, submandibular, tonsillar, preauricular, posterior auricular or occipital adenopathy.      Cervical: No cervical adenopathy.   Skin:     General: Skin is warm and dry.      Coloration: Skin is not pale.   Neurological:      Mental Status: He is alert and oriented to person, place, and time.      Cranial Nerves: No cranial nerve deficit.      Sensory: No sensory deficit.      Coordination: Coordination normal.      Gait: Gait normal.      Deep Tendon Reflexes: Reflexes are normal and symmetric.         Data / Lab Results:    Hemoglobin A1C   Date Value  Ref Range Status   02/08/2022 5.5 % Final        Lab Results   Component Value Date     (H) 02/10/2022     (H) 02/02/2021     (H) 07/21/2020     Lab Results   Component Value Date    CHOL 211 (H) 02/12/2018    CHOL 177 01/03/2018    CHOL 246 (H) 03/22/2017     Lab Results   Component Value Date    TRIG 308 (H) 02/10/2022    TRIG 300 (H) 02/02/2021    TRIG 313 (H) 07/21/2020     Lab Results   Component Value Date    HDL 26 (L) 02/10/2022    HDL 28 (L) 02/02/2021    HDL 26 (L) 07/21/2020     Lab Results   Component Value Date    PSA 1.9 02/10/2022    PSA 2.6 07/21/2020     Lab Results   Component Value Date    WBC 9.7 02/10/2022    HGB 16.8 02/10/2022    HCT 48.3 02/10/2022    MCV 87 02/10/2022     02/10/2022     Lab Results   Component Value Date    TSH 3.170 02/10/2022     Lab Results   Component Value Date    GLUCOSE 92 02/10/2022    BUN 20 02/10/2022    CREATININE 1.28 (H) 02/10/2022    EGFRIFNONA 64 02/10/2022    EGFRIFAFRI 74 02/10/2022    BCR 16 02/10/2022    K 3.7 02/10/2022    CO2 21 02/10/2022    CALCIUM 10.2 02/10/2022    PROTENTOTREF 7.6 02/10/2022    ALBUMIN 4.5 02/10/2022    LABIL2 1.5 02/10/2022    AST 43 (H) 02/10/2022    ALT 42 02/10/2022     Lab Results   Component Value Date    BERNICE  02/13/2018     NEGATIVE This result is designed to aid in the diagnosis of many of the    BERNICE  02/13/2018     systemic autoimmune disorders and is not diagnostic by itself.  Test results    BERNICE  02/13/2018     should be interpreted in conjunction with the clinical evaluation of the    BERNICE  02/13/2018     patient.  SLE patients undergoing steroid therapy may have negative results.      Lab Results   Component Value Date    CRP 18.99 (H) 02/16/2018      No results found for: IRON, TIBC, FERRITIN   No results found for: [id number]     Age-appropriate Screening Schedule:  Refer to the list below for future screening recommendations based on patient's age, sex and/or medical conditions. Orders for  these recommended tests are listed in the plan section. The patient has been provided with a written plan.    Health Maintenance   Topic Date Due   • COLORECTAL CANCER SCREENING  Never done   • COVID-19 Vaccine (1) Never done   • TDAP/TD VACCINES (1 - Tdap) Never done   • HEPATITIS C SCREENING  Never done   • ZOSTER VACCINE (1 of 2) Never done   • PROSTATE CANCER SCREENING  02/10/2023   • LIPID PANEL  02/10/2023   • INFLUENZA VACCINE  03/31/2023 (Originally 8/1/2022)   • ANNUAL PHYSICAL  02/21/2024   • Pneumococcal Vaccine 0-64  Aged Out           Assessment & Plan      Medications        Problem List         LOS      Physical.  Doing well.  Recommend update tetanus vaccine through [employer].  Flu vaccine, pneumonia, Covid vaccine declined.    Discussed coated baby aspirin daily.  Discussed health maintenance, screening test, lifestyle modification.  Hypertension.      Much improved today on Rx.  Discussed low-sodium diet.  EKG with Q waves in inferior leads, no acute ST/T wave changes..   Has had cardiology eval per Dr. Jason, stress echocardiogram benign 2021. History of benign cardiac stress testing 2012.    Family history CVA, his mother.  Recommend carotid Dopplers he declines currently secondary to cost.  Hyperlipidemia.  Much improved on atorvastatin, TriCor.  Restart fish oil. History of triglycerides greater than 1000.  Discussed diet, exercise, lifestyle modification.  Follow-up recheck  VERONICA.  He is attempting CPAP.  Recommend repeat sleep titration, he declined secondary to cost.  Gout.  Improved on allopurinol.  Follow-up recheck uric acid.  Depression.    Improved on sertraline.  Fatty liver.  Stable, mild elevation LFTs, continue fish oil, continue to monitor.  Elevated fasting blood sugar.      Improved today, , A1c 5.5.  History of 117/borderline.  Discussed diet, exercise, lifestyle modification.    Recheck fasting labs.  Prostate cancer screening.  PSA has been normal/recheck.  Colon cancer  screening.  Recommend colonoscopy/Cologuard he declines currently secondary to cost.  Seborrheic keratosis.  Multiple old face, benign appearance.  Topical care discussed.  Consider freezing if worsening symptoms.  Discussed sun protection.  Follow-up recheck.  Call return if worsening symptoms.      Diagnoses and all orders for this visit:    1. Annual visit for general adult medical examination with abnormal findings (Primary)    2. Essential hypertension  -     CBC & Differential  -     Comprehensive Metabolic Panel  -     TSH    3. Hyperlipidemia, unspecified hyperlipidemia type  -     Lipid Panel With / Chol / HDL Ratio  -     atorvastatin (LIPITOR) 40 MG tablet; Take 1 tablet by mouth Daily.  Dispense: 90 tablet; Refill: 3  -     fenofibrate (TRICOR) 145 MG tablet; Take 1 tablet by mouth Daily.  Dispense: 90 tablet; Refill: 3    4. Class 2 severe obesity due to excess calories with serious comorbidity and body mass index (BMI) of 35.0 to 35.9 in adult ([facility])  Assessment & Plan:  Patient's (Body mass index is 35.04 kg/m².) indicates that they are morbidly/severely obese (BMI > 40 or > 35 with obesity - related health condition) with health conditions that include hypertension and dyslipidemias . Weight is worsening. BMI  is above average; BMI management plan is completed. We discussed portion control and increasing exercise.       5. Screening for malignant neoplasm of prostate  -     PSA Screen    6. Screening for malignant neoplasm of colon    7. Gout, unspecified cause, unspecified chronicity, unspecified site  -     Uric Acid  -     allopurinol (ZYLOPRIM) 300 MG tablet; Take 1 tablet by mouth Daily.  Dispense: 90 tablet; Refill: 3    8. Hypertension, benign  -     hydroCHLOROthiazide (HYDRODIURIL) 25 MG tablet; Take 1 tablet by mouth Daily.  Dispense: 90 tablet; Refill: 3  -     lisinopril (PRINIVIL,ZESTRIL) 40 MG tablet; Take 1 tablet by mouth Daily.  Dispense: 90 tablet; Refill: 3  -     metoprolol  succinate XL (TOPROL-XL) 100 MG 24 hr tablet; Take 1 tablet by mouth Daily.  Dispense: 90 tablet; Refill: 3    9. Anxiety  -     sertraline (ZOLOFT) 50 MG tablet; Take 1 tablet by mouth Daily.  Dispense: 90 tablet; Refill: 3            Expected course, medications, and adverse effects discussed.  Call or return if worsening or persistent symptoms.  I wore protective equipment throughout this patient encounter including a mask, gloves, and eye protection.  Hand hygiene was performed before donning protective equipment and after removal when leaving the room. The complete contents of the Assessment and Plan and Data / Lab Results as documented above have been reviewed and addressed by myself with the patient today as part of an ongoing evaluation / treatment plan.  If some of the documentation has been copied from a previous note and is unchanged it indicates that this problem / plan has been assessed today but is stable from a previous visit and no changes have been recommended.  The separate E/M service provided today is significant, medically necessary, and separately identifiable.

## 2023-02-21 ENCOUNTER — OFFICE VISIT (OUTPATIENT)
Dept: FAMILY MEDICINE CLINIC | Facility: CLINIC | Age: 53
End: 2023-02-21

## 2023-02-21 ENCOUNTER — TELEPHONE (OUTPATIENT)
Dept: FAMILY MEDICINE CLINIC | Facility: CLINIC | Age: 53
End: 2023-02-21

## 2023-02-21 VITALS
RESPIRATION RATE: 16 BRPM | WEIGHT: 247.8 LBS | TEMPERATURE: 98.9 F | HEART RATE: 88 BPM | OXYGEN SATURATION: 93 % | SYSTOLIC BLOOD PRESSURE: 132 MMHG | DIASTOLIC BLOOD PRESSURE: 82 MMHG | BODY MASS INDEX: 34.69 KG/M2 | HEIGHT: 71 IN

## 2023-02-21 DIAGNOSIS — Z12.5 SCREENING FOR MALIGNANT NEOPLASM OF PROSTATE: ICD-10-CM

## 2023-02-21 DIAGNOSIS — E78.5 HYPERLIPIDEMIA, UNSPECIFIED HYPERLIPIDEMIA TYPE: ICD-10-CM

## 2023-02-21 DIAGNOSIS — I10 HYPERTENSION, BENIGN: ICD-10-CM

## 2023-02-21 DIAGNOSIS — Z00.01 ANNUAL VISIT FOR GENERAL ADULT MEDICAL EXAMINATION WITH ABNORMAL FINDINGS: Primary | ICD-10-CM

## 2023-02-21 DIAGNOSIS — E66.01 CLASS 2 SEVERE OBESITY DUE TO EXCESS CALORIES WITH SERIOUS COMORBIDITY AND BODY MASS INDEX (BMI) OF 35.0 TO 35.9 IN ADULT: ICD-10-CM

## 2023-02-21 DIAGNOSIS — Z12.11 SCREENING FOR MALIGNANT NEOPLASM OF COLON: ICD-10-CM

## 2023-02-21 DIAGNOSIS — I10 ESSENTIAL HYPERTENSION: ICD-10-CM

## 2023-02-21 DIAGNOSIS — M10.9 GOUT, UNSPECIFIED CAUSE, UNSPECIFIED CHRONICITY, UNSPECIFIED SITE: ICD-10-CM

## 2023-02-21 DIAGNOSIS — F41.9 ANXIETY: ICD-10-CM

## 2023-02-21 PROBLEM — E66.812 CLASS 2 SEVERE OBESITY DUE TO EXCESS CALORIES WITH SERIOUS COMORBIDITY AND BODY MASS INDEX (BMI) OF 35.0 TO 35.9 IN ADULT: Status: ACTIVE | Noted: 2019-07-23

## 2023-02-21 PROCEDURE — 99213 OFFICE O/P EST LOW 20 MIN: CPT | Performed by: FAMILY MEDICINE

## 2023-02-21 PROCEDURE — 99396 PREV VISIT EST AGE 40-64: CPT | Performed by: FAMILY MEDICINE

## 2023-02-21 RX ORDER — ALLOPURINOL 300 MG/1
300 TABLET ORAL DAILY
Qty: 90 TABLET | Refills: 3 | Status: SHIPPED | OUTPATIENT
Start: 2023-02-21

## 2023-02-21 RX ORDER — ATORVASTATIN CALCIUM 40 MG/1
40 TABLET, FILM COATED ORAL DAILY
Qty: 90 TABLET | Refills: 3 | Status: SHIPPED | OUTPATIENT
Start: 2023-02-21

## 2023-02-21 RX ORDER — METOPROLOL SUCCINATE 100 MG/1
100 TABLET, EXTENDED RELEASE ORAL DAILY
Qty: 90 TABLET | Refills: 3 | Status: SHIPPED | OUTPATIENT
Start: 2023-02-21

## 2023-02-21 RX ORDER — HYDROCHLOROTHIAZIDE 25 MG/1
25 TABLET ORAL DAILY
Qty: 90 TABLET | Refills: 3 | Status: SHIPPED | OUTPATIENT
Start: 2023-02-21

## 2023-02-21 RX ORDER — LISINOPRIL 40 MG/1
40 TABLET ORAL DAILY
Qty: 90 TABLET | Refills: 3 | Status: SHIPPED | OUTPATIENT
Start: 2023-02-21

## 2023-02-21 RX ORDER — FENOFIBRATE 145 MG/1
145 TABLET, COATED ORAL DAILY
Qty: 90 TABLET | Refills: 3 | Status: SHIPPED | OUTPATIENT
Start: 2023-02-21

## 2023-02-21 NOTE — ASSESSMENT & PLAN NOTE
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 Pt s/p C6-7 ACDF with washout on 11/6.     - Neurologically stable on exam  - Post op xrays pending  - C-collar in place. C-collar to be worn when up and OOB. Okay to remove when lying flat.   - HV drain in place to full suction. Output to be emptied and recorded q 6 hours.   - OR cultures with NGTD/pending  - ID: BCx 10/31 with MSSA. Repeat BCx with NGTD. Continue cefazolin 6g continuous.   - Recent IVDU: Recommend addiction psych consult for counseling per primary  - NSGY will continue to follow. Please page with any questions or changes in neuro exam.     Discussed with Dr. Malone   Patient's (Body mass index is 35.04 kg/m².) indicates that they are morbidly/severely obese (BMI > 40 or > 35 with obesity - related health condition) with health conditions that include hypertension and dyslipidemias . Weight is worsening. BMI  is above average; BMI management plan is completed. We discussed portion control and increasing exercise.

## 2023-02-21 NOTE — TELEPHONE ENCOUNTER
Mr saleh came in for a visit today while in office he stated that ángel saleh is admitted to [facility] [address] and gave them authority to give you access to all her records, she was admitted yesterday.

## 2023-02-22 LAB
ALBUMIN SERPL-MCNC: 4.9 G/DL (ref 3.8–4.9)
ALBUMIN/GLOB SERPL: 1.6 {RATIO} (ref 1.2–2.2)
ALP SERPL-CCNC: 50 IU/L (ref 44–121)
ALT SERPL-CCNC: 38 IU/L (ref 0–44)
AST SERPL-CCNC: 35 IU/L (ref 0–40)
BASOPHILS # BLD AUTO: 0.1 X10E3/UL (ref 0–0.2)
BASOPHILS NFR BLD AUTO: 1 %
BILIRUB SERPL-MCNC: 0.5 MG/DL (ref 0–1.2)
BUN SERPL-MCNC: 22 MG/DL (ref 6–24)
BUN/CREAT SERPL: 20 (ref 9–20)
CALCIUM SERPL-MCNC: 10.7 MG/DL (ref 8.7–10.2)
CHLORIDE SERPL-SCNC: 98 MMOL/L (ref 96–106)
CHOLEST SERPL-MCNC: 197 MG/DL (ref 100–199)
CHOLEST/HDLC SERPL: 7.6 RATIO (ref 0–5)
CO2 SERPL-SCNC: 27 MMOL/L (ref 20–29)
CREAT SERPL-MCNC: 1.09 MG/DL (ref 0.76–1.27)
EGFRCR SERPLBLD CKD-EPI 2021: 81 ML/MIN/1.73
EOSINOPHIL # BLD AUTO: 0.2 X10E3/UL (ref 0–0.4)
EOSINOPHIL NFR BLD AUTO: 3 %
ERYTHROCYTE [DISTWIDTH] IN BLOOD BY AUTOMATED COUNT: 12.8 % (ref 11.6–15.4)
GLOBULIN SER CALC-MCNC: 3 G/DL (ref 1.5–4.5)
GLUCOSE SERPL-MCNC: 85 MG/DL (ref 70–99)
HCT VFR BLD AUTO: 50.6 % (ref 37.5–51)
HDLC SERPL-MCNC: 26 MG/DL
HGB BLD-MCNC: 16.6 G/DL (ref 13–17.7)
IMM GRANULOCYTES # BLD AUTO: 0 X10E3/UL (ref 0–0.1)
IMM GRANULOCYTES NFR BLD AUTO: 0 %
LDLC SERPL CALC-MCNC: 113 MG/DL (ref 0–99)
LYMPHOCYTES # BLD AUTO: 3.6 X10E3/UL (ref 0.7–3.1)
LYMPHOCYTES NFR BLD AUTO: 39 %
MCH RBC QN AUTO: 29.1 PG (ref 26.6–33)
MCHC RBC AUTO-ENTMCNC: 32.8 G/DL (ref 31.5–35.7)
MCV RBC AUTO: 89 FL (ref 79–97)
MONOCYTES # BLD AUTO: 0.5 X10E3/UL (ref 0.1–0.9)
MONOCYTES NFR BLD AUTO: 6 %
NEUTROPHILS # BLD AUTO: 4.8 X10E3/UL (ref 1.4–7)
NEUTROPHILS NFR BLD AUTO: 51 %
PLATELET # BLD AUTO: 232 X10E3/UL (ref 150–450)
POTASSIUM SERPL-SCNC: 4 MMOL/L (ref 3.5–5.2)
PROT SERPL-MCNC: 7.9 G/DL (ref 6–8.5)
PSA SERPL-MCNC: 2.3 NG/ML (ref 0–4)
RBC # BLD AUTO: 5.7 X10E6/UL (ref 4.14–5.8)
SODIUM SERPL-SCNC: 143 MMOL/L (ref 134–144)
TRIGL SERPL-MCNC: 335 MG/DL (ref 0–149)
TSH SERPL DL<=0.005 MIU/L-ACNC: 3.05 UIU/ML (ref 0.45–4.5)
URATE SERPL-MCNC: 5.5 MG/DL (ref 3.8–8.4)
VLDLC SERPL CALC-MCNC: 58 MG/DL (ref 5–40)
WBC # BLD AUTO: 9.2 X10E3/UL (ref 3.4–10.8)

## 2023-05-19 DIAGNOSIS — E78.5 HYPERLIPIDEMIA, UNSPECIFIED HYPERLIPIDEMIA TYPE: ICD-10-CM

## 2023-05-19 DIAGNOSIS — F41.9 ANXIETY: ICD-10-CM

## 2023-05-19 DIAGNOSIS — M10.9 GOUT, UNSPECIFIED CAUSE, UNSPECIFIED CHRONICITY, UNSPECIFIED SITE: ICD-10-CM

## 2023-05-19 DIAGNOSIS — I10 HYPERTENSION, BENIGN: ICD-10-CM

## 2023-05-19 RX ORDER — ASPIRIN 81 MG/1
81 TABLET ORAL DAILY
Qty: 90 TABLET | Refills: 3 | Status: SHIPPED | OUTPATIENT
Start: 2023-05-19

## 2023-05-19 RX ORDER — FENOFIBRATE 145 MG/1
145 TABLET, COATED ORAL DAILY
Qty: 90 TABLET | Refills: 3 | Status: SHIPPED | OUTPATIENT
Start: 2023-05-19

## 2023-05-19 RX ORDER — METOPROLOL SUCCINATE 100 MG/1
100 TABLET, EXTENDED RELEASE ORAL DAILY
Qty: 90 TABLET | Refills: 3 | Status: SHIPPED | OUTPATIENT
Start: 2023-05-19

## 2023-05-19 RX ORDER — ALLOPURINOL 300 MG/1
300 TABLET ORAL DAILY
Qty: 90 TABLET | Refills: 3 | Status: SHIPPED | OUTPATIENT
Start: 2023-05-19

## 2023-05-19 RX ORDER — LISINOPRIL 40 MG/1
40 TABLET ORAL DAILY
Qty: 90 TABLET | Refills: 3 | Status: SHIPPED | OUTPATIENT
Start: 2023-05-19

## 2023-05-19 RX ORDER — HYDROCHLOROTHIAZIDE 25 MG/1
25 TABLET ORAL DAILY
Qty: 90 TABLET | Refills: 3 | Status: SHIPPED | OUTPATIENT
Start: 2023-05-19

## 2023-05-19 RX ORDER — ATORVASTATIN CALCIUM 40 MG/1
40 TABLET, FILM COATED ORAL DAILY
Qty: 90 TABLET | Refills: 3 | Status: SHIPPED | OUTPATIENT
Start: 2023-05-19

## 2023-05-19 NOTE — TELEPHONE ENCOUNTER
PATIENT IS ALSO WANTING US TO NOTATE THAT HE NEEDS THESE PACKAGED     Caller: Francesco Sam    Relationship: Self    Best call back number:   [phone] (Work)    Requested Prescriptions:   Requested Prescriptions     Pending Prescriptions Disp Refills   • fenofibrate (TRICOR) 145 MG tablet 90 tablet 3     Sig: Take 1 tablet by mouth Daily.   • allopurinol (ZYLOPRIM) 300 MG tablet 90 tablet 3     Sig: Take 1 tablet by mouth Daily.   • aspirin 81 MG EC tablet       Sig: Take 1 tablet by mouth Daily.   • lisinopril (PRINIVIL,ZESTRIL) 40 MG tablet 90 tablet 3     Sig: Take 1 tablet by mouth Daily.   • hydroCHLOROthiazide (HYDRODIURIL) 25 MG tablet 90 tablet 3     Sig: Take 1 tablet by mouth Daily.   • sertraline (ZOLOFT) 50 MG tablet 90 tablet 3     Sig: Take 1 tablet by mouth Daily.   • metoprolol succinate XL (TOPROL-XL) 100 MG 24 hr tablet 90 tablet 3     Sig: Take 1 tablet by mouth Daily.   • atorvastatin (LIPITOR) 40 MG tablet 90 tablet 3     Sig: Take 1 tablet by mouth Daily.        Pharmacy where request should be sent: [facility] [place], [place] - [address]  [phone] [facility] [phone] FX     Last office visit with prescribing clinician: 2/21/2023   Last telemedicine visit with prescribing clinician: 2/21/2023   Next office visit with prescribing clinician: 2/27/2024     Additional details provided by patient:     Does the patient have less than a 3 day supply:  [x] Yes  [] No    Would you like a call back once the refill request has been completed: [x] Yes [] No    If the office needs to give you a call back, can they leave a voicemail: [] Yes [] No    Solomon Guardado   05/19/23 08:42 EDT

## 2024-03-04 DIAGNOSIS — M10.9 GOUT, UNSPECIFIED CAUSE, UNSPECIFIED CHRONICITY, UNSPECIFIED SITE: ICD-10-CM

## 2024-03-04 DIAGNOSIS — E78.5 HYPERLIPIDEMIA, UNSPECIFIED HYPERLIPIDEMIA TYPE: ICD-10-CM

## 2024-03-04 DIAGNOSIS — I10 HYPERTENSION, BENIGN: ICD-10-CM

## 2024-03-04 DIAGNOSIS — F41.9 ANXIETY: ICD-10-CM

## 2024-03-04 NOTE — TELEPHONE ENCOUNTER
----- Message from Francesco Sam sent at 3/2/2024  4:28 PM EST -----  Regarding: New Pharmacy  Contact: [phone]  Please change may default Pharmacy to:  [facility]  NCPDP ID # [id number]  You MUST include your patient's email address: [email or web address]  ----[facility]  ----[address] [place] [address]  ----[place] [address]  ----[email or web address]    Then please send 90 day Rx for the following:  Drug          Generic    Strength Type  ZOLOFT  SERTRALINE  50mg TAB  TOPROL XL         METOPROL  100mg TAB  ZYLOPRIM         ALLOPURINOL   300mg TAB  PRINIVIL  LISINOPRIL  4mg TAB  TRICOR  FENOBIBRATE    145mg TAB   LIPITOR  ATORVASTATIN  40mg TAB    I could not find the following on their list of Medications:  Drug          Generic            Strength Type  HYDRODIURIL HYDROCHLOROT         25mg TAB    But they do have this:  Drug          Generic            Strength Type  Microzide         Hydrochlorothiazide 25mg TAB  If acceptable, please also send 90 day Rx for the latter    Francesco eLyva.

## 2024-03-08 RX ORDER — HYDROCHLOROTHIAZIDE 25 MG/1
25 TABLET ORAL DAILY
Qty: 90 TABLET | Refills: 0 | Status: SHIPPED | OUTPATIENT
Start: 2024-03-08

## 2024-03-08 RX ORDER — FENOFIBRATE 145 MG/1
145 TABLET, COATED ORAL DAILY
Qty: 90 TABLET | Refills: 0 | Status: SHIPPED | OUTPATIENT
Start: 2024-03-08

## 2024-03-08 RX ORDER — METOPROLOL SUCCINATE 100 MG/1
100 TABLET, EXTENDED RELEASE ORAL DAILY
Qty: 90 TABLET | Refills: 0 | Status: SHIPPED | OUTPATIENT
Start: 2024-03-08

## 2024-03-08 RX ORDER — LISINOPRIL 40 MG/1
40 TABLET ORAL DAILY
Qty: 90 TABLET | Refills: 0 | Status: SHIPPED | OUTPATIENT
Start: 2024-03-08

## 2024-03-08 RX ORDER — ATORVASTATIN CALCIUM 40 MG/1
40 TABLET, FILM COATED ORAL DAILY
Qty: 90 TABLET | Refills: 0 | Status: SHIPPED | OUTPATIENT
Start: 2024-03-08

## 2024-03-08 RX ORDER — ALLOPURINOL 300 MG/1
300 TABLET ORAL DAILY
Qty: 90 TABLET | Refills: 0 | Status: SHIPPED | OUTPATIENT
Start: 2024-03-08

## 2024-04-11 ENCOUNTER — TELEPHONE (OUTPATIENT)
Dept: FAMILY MEDICINE CLINIC | Facility: CLINIC | Age: 54
End: 2024-04-11
Payer: COMMERCIAL

## 2024-04-11 NOTE — TELEPHONE ENCOUNTER
Caller: Francesco Sam    Relationship: Self    Best call back number: [phone]    PATIENT HAS A PHYSICAL SCHEDULED WITH DR VIVAR 5/28/24.    CURRENTLY HE CANNOT HAVE LABS DRAWN.    THERE IS A BILLING ISSUE AT HAND, AND [facility] WONT LET HIM DRAW ANYMORE LABS TIL A BILL IS PAID- WHICH PATIENT SHOULD HAVE NEVER RECEIVED DUE TO HAVING COVERAGE.    HE IS HOPING THIS IS SITUATED BEFORE MAY 28 FOR HIS PHYSICAL.

## 2024-04-12 NOTE — TELEPHONE ENCOUNTER
I called and spoke with patient. Let him know we can draw his blood at his appointment if he is having issues at [facility].

## 2024-05-21 NOTE — PROGRESS NOTES
Bethanie Sam is a 54 y.o. male.     Chief Complaint   Patient presents with    Annual Exam    Hypertension    Hyperlipidemia       The patient is here: to discuss health maintenance and disease prevention to follow up on multiple medical problems.  Last comprehensive physical was on 2/21/2023.  Previous physical was performed by  Reyes Tubbs MD  Overall has: moderate activity with work/home activities, good appetite, feels well with minor complaints, decreased energy level, and is sleeping well. Nutrition: eating a variety of foods. Last tetanus shot was unknown.           Hypertension  This is a chronic problem. The current episode started more than 1 year ago. The problem has been gradually worsening since onset. The problem is uncontrolled. Associated symptoms include anxiety, malaise/fatigue, orthopnea, PND, shortness of breath and sweats. Pertinent negatives include no blurred vision, chest pain, headaches, neck pain, palpitations or peripheral edema. Agents associated with hypertension include NSAIDs. Risk factors for coronary artery disease include dyslipidemia, family history, obesity, sedentary lifestyle and stress. Current antihypertension treatment includes ACE inhibitors, beta blockers and diuretics. There are no compliance problems.    Hyperlipidemia  This is a chronic problem. The current episode started more than 1 year ago. Recent lipid tests were reviewed and are high. Exacerbating diseases include obesity. He has no history of diabetes or hypothyroidism. Associated symptoms include shortness of breath. Pertinent negatives include no chest pain or leg pain. Current antihyperlipidemic treatment includes statins and herbal therapy. There are no compliance problems.  Risk factors for coronary artery disease include dyslipidemia, hypertension, male sex and obesity.        Recent Hospitalizations:  No hospitalization(s) within the last year..  ccc      I personally reviewed and  updated the patient's allergies, medications, problem list, and past medical, surgical, social, and family history. I have reviewed and confirmed the accuracy of the HPI and ROS as documented by the MA/LPN/RN Reyes Tubbs MD    Family History   Problem Relation Age of Onset    Heart disease Mother     Hypertension Mother     Stroke Mother     Arthritis Mother     Depression Mother     Diabetes Mother         Borderline    Heart disease Father     Cancer Father     Lung cancer Paternal Uncle     Cancer Paternal Uncle     Gout Maternal Grandmother     Depression Maternal Grandmother     Diabetes Maternal Grandmother         Borderline    Heart disease Maternal Grandmother     Hypertension Maternal Grandmother     Miscarriages / Stillbirths Maternal Grandmother     Hearing loss Maternal Grandfather     Hypertension Maternal Grandfather     Heart disease Maternal Grandfather     Cancer Paternal Grandfather     Anxiety disorder Maternal Aunt     Depression Maternal Aunt     Asthma Sister     Depression Sister     Depression Sister     Hypertension Sister     Miscarriages / Stillbirths Sister     Depression Daughter     Depression Daughter        Social History     Tobacco Use    Smoking status: Never     Passive exposure: Past    Smokeless tobacco: Never   Vaping Use    Vaping status: Never Used   Substance Use Topics    Alcohol use: Yes     Comment: New Years and Holy Communion    Drug use: No       Past Surgical History:   Procedure Laterality Date    COLONOSCOPY  01/15/1996       Patient Active Problem List   Diagnosis    Acute crisis reaction    Chest pain    Gout    Headache    Hemorrhoids    Familial hypercholesterolemia    Malaise and fatigue    Nonorganic sleep disorder    VERONICA (obstructive sleep apnea)    Class 2 severe obesity due to excess calories with serious comorbidity and body mass index (BMI) of 35.0 to 35.9 in adult    Annual visit for general adult medical examination with abnormal findings     Screening for diabetes mellitus    Seasonal allergic rhinitis due to pollen    Abnormal EKG    Essential hypertension    Right wrist pain         Current Outpatient Medications:     allopurinol (ZYLOPRIM) 300 MG tablet, Take 1 tablet by mouth Daily., Disp: 90 tablet, Rfl: 0    aspirin 81 MG EC tablet, Take 1 tablet by mouth Daily., Disp: 90 tablet, Rfl: 3    atorvastatin (LIPITOR) 40 MG tablet, Take 1 tablet by mouth Daily., Disp: 90 tablet, Rfl: 0    Cholecalciferol (VITAMIN D3) 3000 units tablet, Take 1 tablet by mouth., Disp: , Rfl:     coenzyme Q10 100 MG capsule, Take 2 capsules by mouth Daily., Disp: , Rfl:     fenofibrate (TRICOR) 145 MG tablet, Take 1 tablet by mouth Daily., Disp: 90 tablet, Rfl: 0    hydroCHLOROthiazide 25 MG tablet, Take 1 tablet by mouth Daily., Disp: 90 tablet, Rfl: 0    lisinopril (PRINIVIL,ZESTRIL) 40 MG tablet, Take 1 tablet by mouth Daily., Disp: 90 tablet, Rfl: 0    LORATADINE PO, Take 10 mg by mouth Daily., Disp: , Rfl:     metoprolol succinate XL (TOPROL-XL) 100 MG 24 hr tablet, Take 1 tablet by mouth Daily., Disp: 90 tablet, Rfl: 0    NON FORMULARY, Puravive: weight loss supplement, Disp: , Rfl:     NON FORMULARY, B- Healthy supplement Vitamin b12, b2, b6, Disp: , Rfl:     NON FORMULARY, Food enzymes, Disp: , Rfl:     NON FORMULARY, Adrenal support, Disp: , Rfl:     NON FORMULARY, Berberine: glucose control, Disp: , Rfl:     NON FORMULARY, 10 mg. L-Methyl folate, Disp: , Rfl:     sertraline (ZOLOFT) 50 MG tablet, Take 1 tablet by mouth Daily., Disp: 90 tablet, Rfl: 0    fluticasone (FLONASE) 50 MCG/ACT nasal spray, 2 sprays into the nostril(s) as directed by provider Daily., Disp: 11.1 mL, Rfl: 3    NON FORMULARY, TESTboost has hawthorn berry, ginseng root, ashwagandha root, tribulus terrestris fruit (Patient not taking: Reported on 5/28/2024), Disp: , Rfl:     Review of Systems   Constitutional:  Positive for malaise/fatigue. Negative for chills and diaphoresis.   HENT:   "Negative for trouble swallowing and voice change.    Eyes:  Negative for blurred vision and visual disturbance.   Respiratory:  Positive for shortness of breath.    Cardiovascular:  Positive for orthopnea and PND. Negative for chest pain and palpitations.   Gastrointestinal:  Negative for abdominal pain and nausea.   Endocrine: Negative for polydipsia and polyphagia.   Genitourinary:  Negative for hematuria.   Musculoskeletal:  Negative for neck pain and neck stiffness.   Skin:  Negative for color change and pallor.   Allergic/Immunologic: Negative for immunocompromised state.   Neurological:  Negative for seizures and syncope.   Hematological:  Negative for adenopathy.   Psychiatric/Behavioral:  Negative for hallucinations, sleep disturbance and suicidal ideas.        I have reviewed and confirmed the accuracy of the ROS as documented by the MA/LPN/RN Reyes Tubbs MD      Objective   /88 (BP Location: Right arm, Patient Position: Sitting, Cuff Size: Large Adult)   Pulse 68   Temp 98 °F (36.7 °C)   Resp 18   Ht 179.1 cm (70.5\")   Wt 112 kg (247 lb)   SpO2 96%   BMI 34.94 kg/m²   BP Readings from Last 3 Encounters:   05/28/24 120/88   02/21/23 132/82   07/08/22 127/83     Wt Readings from Last 3 Encounters:   05/28/24 112 kg (247 lb)   02/21/23 112 kg (247 lb 12.8 oz)   07/08/22 116 kg (255 lb)     Physical Exam  Constitutional:       Appearance: He is well-developed. He is not diaphoretic.   HENT:      Head: Normocephalic.      Right Ear: Hearing, tympanic membrane, ear canal and external ear normal.      Left Ear: Hearing, tympanic membrane, ear canal and external ear normal.      Nose: Nose normal. No mucosal edema or congestion.      Right Sinus: No maxillary sinus tenderness or frontal sinus tenderness.      Left Sinus: No maxillary sinus tenderness or frontal sinus tenderness.      Mouth/Throat:      Mouth: No oral lesions.      Pharynx: Uvula midline. No oropharyngeal exudate or posterior " oropharyngeal erythema.      Tonsils: No tonsillar exudate.   Eyes:      General: Lids are normal.      Conjunctiva/sclera: Conjunctivae normal.      Pupils: Pupils are equal, round, and reactive to light.   Neck:      Thyroid: No thyromegaly.      Vascular: No carotid bruit or JVD.      Trachea: No tracheal deviation.   Cardiovascular:      Rate and Rhythm: Normal rate and regular rhythm.      Heart sounds: Normal heart sounds. No murmur heard.     No friction rub. No gallop.   Pulmonary:      Effort: Pulmonary effort is normal.      Breath sounds: Normal breath sounds. No stridor. No decreased breath sounds, wheezing or rales.   Abdominal:      General: Bowel sounds are normal. There is no distension.      Palpations: Abdomen is soft. There is no mass.      Tenderness: There is no abdominal tenderness. There is no guarding or rebound.      Hernia: No hernia is present.   Lymphadenopathy:      Head:      Right side of head: No submental, submandibular, tonsillar, preauricular, posterior auricular or occipital adenopathy.      Left side of head: No submental, submandibular, tonsillar, preauricular, posterior auricular or occipital adenopathy.      Cervical: No cervical adenopathy.   Skin:     General: Skin is warm and dry.      Coloration: Skin is not pale.   Neurological:      Mental Status: He is alert and oriented to person, place, and time.      Cranial Nerves: No cranial nerve deficit.      Sensory: No sensory deficit.      Coordination: Coordination normal.      Gait: Gait normal.      Deep Tendon Reflexes: Reflexes are normal and symmetric.         Data / Lab Results:    Hemoglobin A1C   Date Value Ref Range Status   02/08/2022 5.5 % Final        Lab Results   Component Value Date     (H) 02/21/2023     (H) 02/10/2022     (H) 02/02/2021     Lab Results   Component Value Date    CHOL 211 (H) 02/12/2018    CHOL 177 01/03/2018    CHOL 246 (H) 03/22/2017     Lab Results   Component Value Date  "   TRIG 335 (H) 02/21/2023    TRIG 308 (H) 02/10/2022    TRIG 300 (H) 02/02/2021     Lab Results   Component Value Date    HDL 26 (L) 02/21/2023    HDL 26 (L) 02/10/2022    HDL 28 (L) 02/02/2021     Lab Results   Component Value Date    PSA 2.3 02/21/2023    PSA 1.9 02/10/2022    PSA 2.6 07/21/2020     Lab Results   Component Value Date    WBC 9.2 02/21/2023    HGB 16.6 02/21/2023    HCT 50.6 02/21/2023    MCV 89 02/21/2023     02/21/2023     Lab Results   Component Value Date    TSH 3.050 02/21/2023     Lab Results   Component Value Date    GLUCOSE 85 02/21/2023    BUN 22 02/21/2023    CREATININE 1.09 02/21/2023    EGFRIFNONA 64 02/10/2022    EGFRIFAFRI 74 02/10/2022    BCR 20 02/21/2023    K 4.0 02/21/2023    CO2 27 02/21/2023    CALCIUM 10.7 (H) 02/21/2023    PROTENTOTREF 7.9 02/21/2023    ALBUMIN 4.9 02/21/2023    LABIL2 1.6 02/21/2023    AST 35 02/21/2023    ALT 38 02/21/2023     Lab Results   Component Value Date    BERNICE  02/13/2018     NEGATIVE This result is designed to aid in the diagnosis of many of the    BERNICE  02/13/2018     systemic autoimmune disorders and is not diagnostic by itself.  Test results    BERNICE  02/13/2018     should be interpreted in conjunction with the clinical evaluation of the    BERNICE  02/13/2018     patient.  SLE patients undergoing steroid therapy may have negative results.      Lab Results   Component Value Date    CRP 18.99 (H) 02/16/2018      No results found for: \"IRON\", \"TIBC\", \"FERRITIN\"   No results found for: \"OKYBHEML86\"     Age-appropriate Screening Schedule:  Refer to the list below for future screening recommendations based on patient's age, sex and/or medical conditions. Orders for these recommended tests are listed in the plan section. The patient has been provided with a written plan.    Health Maintenance   Topic Date Due    COLORECTAL CANCER SCREENING  Never done    TDAP/TD VACCINES (1 - Tdap) Never done    HEPATITIS C SCREENING  Never done    ZOSTER VACCINE (1 of " 2) Never done    COVID-19 Vaccine (1 - 2023-24 season) Never done    PROSTATE CANCER SCREENING  02/21/2024    LIPID PANEL  02/21/2024    INFLUENZA VACCINE  08/01/2024    ANNUAL PHYSICAL  05/28/2025    BMI FOLLOWUP  05/28/2025    Pneumococcal Vaccine 0-64  Aged Out           Assessment & Plan      Medications        Problem List         LOS    Physical.  Doing well.  Recommend update tetanus vaccine through [employer].  Flu vaccine, pneumonia, Covid vaccine declined.    Discussed coated baby aspirin daily.  Discussed health maintenance, screening test, lifestyle modification.  Hypertension.      Much improved today on Rx.  Discussed low-sodium diet.  EKG with Q waves in inferior leads, no acute ST/T wave changes..   Has had cardiology eval per Dr. Jason, stress echocardiogram benign 2021. History of benign cardiac stress testing 2012.    Family history CVA, his mother.  Recommend carotid Dopplers he declines currently secondary to cost, but states he plans to get them checked through community screening..  Hyperlipidemia.  Much improved on atorvastatin, TriCor.  Restart fish oil. History of triglycerides greater than 1000 history of episode of triglyceride induced pancreatitis requiring hospitalization..  Discussed diet, exercise, lifestyle modification.  Follow-up recheck  VERONICA.  Clinically improved today, tolerating CPAP..  Gout.  Improved on allopurinol.  Follow-up recheck uric acid.  Depression.    Improved on sertraline.  Fatty liver.  Stable, mild elevation LFTs, continue fish oil, continue to monitor.  Elevated fasting blood sugar.      Improved today, , A1c 5.5.  History of 117/borderline.  Discussed diet, exercise, lifestyle modification.    Recheck fasting labs.  Prostate cancer screening.  PSA has been normal/recheck.  Colon cancer screening.  Recommend colonoscopy/Cologuard, he agrees to Cologuard today, study scheduled..  Seborrheic keratosis.  Multiple old face, benign appearance.  Topical care  discussed.  Consider freezing if worsening symptoms.  Discussed sun protection.  Follow-up recheck.  Call return if worsening symptoms.      Previous Exams:  Last PSA was 2.3 on 2/21/2023.    Previous PSA was 1.9  on 2/10/2022.    Last EKG was on 7/8/2022 ordered by Dr Jason.    EKG showed normal sinus rhythm.     Inferior small Q waves were noted.    Last Echocardiogram was on 6/5/2021 ordered by Dr Jason.    Left ventricular systolic function is normal.   Left ventricular ejection fraction is 55 to 60%   Left ventricular wall thickness is consistent with mild concentric hypertrophy.   Left ventricular diastolic function is consistent with (grade I) impaired relaxation.   This is normal treadmill exercise stress test with no electrocardiographic or echocardiographic evidence of ischemia        Diagnoses and all orders for this visit:    1. Annual visit for general adult medical examination with abnormal findings (Primary)  -     POCT urinalysis dipstick, manual  -     Cancel: CBC & Differential  -     Cancel: Comprehensive Metabolic Panel  -     Cancel: Lipid Panel With / Chol / HDL Ratio  -     Cancel: PSA Screen  -     Cancel: TSH  -     CBC & Differential  -     Comprehensive Metabolic Panel  -     Lipid Panel With / Chol / HDL Ratio  -     PSA Screen  -     TSH    2. Essential hypertension  -     Cancel: CBC & Differential  -     Cancel: Comprehensive Metabolic Panel  -     Cancel: Lipid Panel With / Chol / HDL Ratio  -     Cancel: TSH  -     CBC & Differential  -     Comprehensive Metabolic Panel  -     Lipid Panel With / Chol / HDL Ratio  -     TSH    3. Familial hypercholesterolemia  -     Cancel: Lipid Panel With / Chol / HDL Ratio  -     Lipid Panel With / Chol / HDL Ratio    4. Anxiety    5. Class 2 severe obesity due to excess calories with serious comorbidity and body mass index (BMI) of 35.0 to 35.9 in adult    6. Screening for malignant neoplasm of prostate  -     Cancel: PSA Screen  -     PSA  Screen    7. Screening for malignant neoplasm of colon  -     Cologuard - Stool, Per Rectum; Future    8. Gout, unspecified cause, unspecified chronicity, unspecified site  -     Cancel: Uric Acid  -     Uric Acid    9. Malaise and fatigue  -     Cancel: CBC & Differential  -     Cancel: Comprehensive Metabolic Panel  -     Cancel: Lipid Panel With / Chol / HDL Ratio  -     Cancel: TSH  -     CBC & Differential  -     Comprehensive Metabolic Panel  -     Lipid Panel With / Chol / HDL Ratio  -     TSH    10. Acute crisis reaction    11. VERONICA (obstructive sleep apnea)        Class 2 Severe Obesity (BMI >=35 and <=39.9). Obesity-related health conditions include the following: hypertension and dyslipidemias. Obesity is unchanged. BMI is is above average; BMI management plan is completed. We discussed portion control and increasing exercise.        Expected course, medications, and adverse effects discussed.  Call or return if worsening or persistent symptoms.  I wore protective equipment throughout this patient encounter including a mask, gloves, and eye protection.  Hand hygiene was performed before donning protective equipment and after removal when leaving the room. The complete contents of the Assessment and Plan and Data / Lab Results as documented above have been reviewed and addressed by myself with the patient today as part of an ongoing evaluation / treatment plan.  If some of the documentation has been copied from a previous note and is unchanged it indicates that this problem / plan has been assessed today but is stable from a previous visit and no changes have been recommended.  The separate E/M service provided today is significant, medically necessary, and separately identifiable.

## 2024-05-28 ENCOUNTER — OFFICE VISIT (OUTPATIENT)
Dept: FAMILY MEDICINE CLINIC | Facility: CLINIC | Age: 54
End: 2024-05-28
Payer: COMMERCIAL

## 2024-05-28 VITALS
WEIGHT: 247 LBS | RESPIRATION RATE: 18 BRPM | DIASTOLIC BLOOD PRESSURE: 88 MMHG | SYSTOLIC BLOOD PRESSURE: 120 MMHG | HEART RATE: 68 BPM | BODY MASS INDEX: 34.58 KG/M2 | TEMPERATURE: 98 F | HEIGHT: 71 IN | OXYGEN SATURATION: 96 %

## 2024-05-28 DIAGNOSIS — R53.83 MALAISE AND FATIGUE: ICD-10-CM

## 2024-05-28 DIAGNOSIS — E66.01 CLASS 2 SEVERE OBESITY DUE TO EXCESS CALORIES WITH SERIOUS COMORBIDITY AND BODY MASS INDEX (BMI) OF 35.0 TO 35.9 IN ADULT: ICD-10-CM

## 2024-05-28 DIAGNOSIS — F41.9 ANXIETY: ICD-10-CM

## 2024-05-28 DIAGNOSIS — M10.9 GOUT, UNSPECIFIED CAUSE, UNSPECIFIED CHRONICITY, UNSPECIFIED SITE: ICD-10-CM

## 2024-05-28 DIAGNOSIS — Z00.01 ANNUAL VISIT FOR GENERAL ADULT MEDICAL EXAMINATION WITH ABNORMAL FINDINGS: Primary | ICD-10-CM

## 2024-05-28 DIAGNOSIS — G47.33 OSA (OBSTRUCTIVE SLEEP APNEA): ICD-10-CM

## 2024-05-28 DIAGNOSIS — Z12.5 SCREENING FOR MALIGNANT NEOPLASM OF PROSTATE: ICD-10-CM

## 2024-05-28 DIAGNOSIS — E78.01 FAMILIAL HYPERCHOLESTEROLEMIA: ICD-10-CM

## 2024-05-28 DIAGNOSIS — R53.81 MALAISE AND FATIGUE: ICD-10-CM

## 2024-05-28 DIAGNOSIS — F43.0 ACUTE CRISIS REACTION: ICD-10-CM

## 2024-05-28 DIAGNOSIS — Z12.11 SCREENING FOR MALIGNANT NEOPLASM OF COLON: ICD-10-CM

## 2024-05-28 DIAGNOSIS — I10 ESSENTIAL HYPERTENSION: ICD-10-CM

## 2024-05-28 LAB
BILIRUB BLD-MCNC: NEGATIVE MG/DL
CLARITY, POC: CLEAR
COLOR UR: YELLOW
GLUCOSE UR STRIP-MCNC: NEGATIVE MG/DL
KETONES UR QL: NEGATIVE
LEUKOCYTE EST, POC: NEGATIVE
NITRITE UR-MCNC: NEGATIVE MG/ML
PH UR: 5 [PH] (ref 5–8)
PROT UR STRIP-MCNC: NEGATIVE MG/DL
RBC # UR STRIP: NEGATIVE /UL
SP GR UR: 1.02 (ref 1–1.03)
UROBILINOGEN UR QL: NORMAL

## 2024-05-28 PROCEDURE — 99396 PREV VISIT EST AGE 40-64: CPT | Performed by: FAMILY MEDICINE

## 2024-05-28 PROCEDURE — 81002 URINALYSIS NONAUTO W/O SCOPE: CPT | Performed by: FAMILY MEDICINE

## 2024-05-28 PROCEDURE — 99213 OFFICE O/P EST LOW 20 MIN: CPT | Performed by: FAMILY MEDICINE

## 2024-05-28 RX ORDER — UBIDECARENONE 100 MG
200 CAPSULE ORAL DAILY
COMMUNITY

## 2024-05-29 LAB
ALBUMIN SERPL-MCNC: 4.6 G/DL (ref 3.8–4.9)
ALBUMIN/GLOB SERPL: 1.5 {RATIO} (ref 1.2–2.2)
ALP SERPL-CCNC: 50 IU/L (ref 44–121)
ALT SERPL-CCNC: 39 IU/L (ref 0–44)
AST SERPL-CCNC: 41 IU/L (ref 0–40)
BASOPHILS # BLD AUTO: 0.1 X10E3/UL (ref 0–0.2)
BASOPHILS NFR BLD AUTO: 1 %
BILIRUB SERPL-MCNC: 0.5 MG/DL (ref 0–1.2)
BUN SERPL-MCNC: 24 MG/DL (ref 6–24)
BUN/CREAT SERPL: 23 (ref 9–20)
CALCIUM SERPL-MCNC: 10.4 MG/DL (ref 8.7–10.2)
CHLORIDE SERPL-SCNC: 101 MMOL/L (ref 96–106)
CHOLEST SERPL-MCNC: 161 MG/DL (ref 100–199)
CHOLEST/HDLC SERPL: 5.2 RATIO (ref 0–5)
CO2 SERPL-SCNC: 24 MMOL/L (ref 20–29)
CREAT SERPL-MCNC: 1.05 MG/DL (ref 0.76–1.27)
EGFRCR SERPLBLD CKD-EPI 2021: 84 ML/MIN/1.73
EOSINOPHIL # BLD AUTO: 0.2 X10E3/UL (ref 0–0.4)
EOSINOPHIL NFR BLD AUTO: 2 %
ERYTHROCYTE [DISTWIDTH] IN BLOOD BY AUTOMATED COUNT: 13.3 % (ref 11.6–15.4)
GLOBULIN SER CALC-MCNC: 3 G/DL (ref 1.5–4.5)
GLUCOSE SERPL-MCNC: 105 MG/DL (ref 70–99)
HCT VFR BLD AUTO: 51.8 % (ref 37.5–51)
HDLC SERPL-MCNC: 31 MG/DL
HGB BLD-MCNC: 17 G/DL (ref 13–17.7)
IMM GRANULOCYTES # BLD AUTO: 0 X10E3/UL (ref 0–0.1)
IMM GRANULOCYTES NFR BLD AUTO: 0 %
LDLC SERPL CALC-MCNC: 102 MG/DL (ref 0–99)
LYMPHOCYTES # BLD AUTO: 3.4 X10E3/UL (ref 0.7–3.1)
LYMPHOCYTES NFR BLD AUTO: 38 %
MCH RBC QN AUTO: 29.7 PG (ref 26.6–33)
MCHC RBC AUTO-ENTMCNC: 32.8 G/DL (ref 31.5–35.7)
MCV RBC AUTO: 91 FL (ref 79–97)
MONOCYTES # BLD AUTO: 0.5 X10E3/UL (ref 0.1–0.9)
MONOCYTES NFR BLD AUTO: 5 %
NEUTROPHILS # BLD AUTO: 4.8 X10E3/UL (ref 1.4–7)
NEUTROPHILS NFR BLD AUTO: 54 %
PLATELET # BLD AUTO: 213 X10E3/UL (ref 150–450)
POTASSIUM SERPL-SCNC: 3.8 MMOL/L (ref 3.5–5.2)
PROT SERPL-MCNC: 7.6 G/DL (ref 6–8.5)
PSA SERPL-MCNC: 1.8 NG/ML (ref 0–4)
RBC # BLD AUTO: 5.72 X10E6/UL (ref 4.14–5.8)
SODIUM SERPL-SCNC: 140 MMOL/L (ref 134–144)
TRIGL SERPL-MCNC: 155 MG/DL (ref 0–149)
TSH SERPL DL<=0.005 MIU/L-ACNC: 1.76 UIU/ML (ref 0.45–4.5)
URATE SERPL-MCNC: 6.4 MG/DL (ref 3.8–8.4)
VLDLC SERPL CALC-MCNC: 28 MG/DL (ref 5–40)
WBC # BLD AUTO: 9 X10E3/UL (ref 3.4–10.8)

## 2024-06-26 DIAGNOSIS — M10.9 GOUT, UNSPECIFIED CAUSE, UNSPECIFIED CHRONICITY, UNSPECIFIED SITE: ICD-10-CM

## 2024-06-26 DIAGNOSIS — F41.9 ANXIETY: ICD-10-CM

## 2024-06-26 DIAGNOSIS — E78.5 HYPERLIPIDEMIA, UNSPECIFIED HYPERLIPIDEMIA TYPE: ICD-10-CM

## 2024-06-26 DIAGNOSIS — I10 HYPERTENSION, BENIGN: ICD-10-CM

## 2024-06-26 RX ORDER — METOPROLOL SUCCINATE 100 MG/1
100 TABLET, EXTENDED RELEASE ORAL DAILY
Qty: 90 TABLET | Refills: 2 | Status: SHIPPED | OUTPATIENT
Start: 2024-06-26

## 2024-06-26 RX ORDER — ALLOPURINOL 300 MG/1
300 TABLET ORAL DAILY
Qty: 90 TABLET | Refills: 2 | Status: SHIPPED | OUTPATIENT
Start: 2024-06-26

## 2024-06-26 RX ORDER — LISINOPRIL 40 MG/1
40 TABLET ORAL DAILY
Qty: 90 TABLET | Refills: 2 | Status: SHIPPED | OUTPATIENT
Start: 2024-06-26

## 2024-06-26 RX ORDER — ATORVASTATIN CALCIUM 40 MG/1
40 TABLET, FILM COATED ORAL DAILY
Qty: 90 TABLET | Refills: 2 | Status: SHIPPED | OUTPATIENT
Start: 2024-06-26

## 2024-06-26 RX ORDER — HYDROCHLOROTHIAZIDE 25 MG/1
25 TABLET ORAL DAILY
Qty: 90 TABLET | Refills: 2 | Status: SHIPPED | OUTPATIENT
Start: 2024-06-26

## 2024-06-26 RX ORDER — FENOFIBRATE 145 MG/1
145 TABLET, COATED ORAL DAILY
Qty: 90 TABLET | Refills: 2 | Status: SHIPPED | OUTPATIENT
Start: 2024-06-26

## 2024-06-26 NOTE — TELEPHONE ENCOUNTER
Caller: FlacoFrancesco calixto    Relationship: Self    Best call back number: [phone]     Requested Prescriptions:   Requested Prescriptions     Pending Prescriptions Disp Refills    fenofibrate (TRICOR) 145 MG tablet 90 tablet 0     Sig: Take 1 tablet by mouth Daily.    atorvastatin (LIPITOR) 40 MG tablet 90 tablet 0     Sig: Take 1 tablet by mouth Daily.    allopurinol (ZYLOPRIM) 300 MG tablet 90 tablet 0     Sig: Take 1 tablet by mouth Daily.    metoprolol succinate XL (TOPROL-XL) 100 MG 24 hr tablet 90 tablet 0     Sig: Take 1 tablet by mouth Daily.    sertraline (ZOLOFT) 50 MG tablet 90 tablet 0     Sig: Take 1 tablet by mouth Daily.    lisinopril (PRINIVIL,ZESTRIL) 40 MG tablet 90 tablet 0     Sig: Take 1 tablet by mouth Daily.    hydroCHLOROthiazide 25 MG tablet 90 tablet 0     Sig: Take 1 tablet by mouth Daily.        Pharmacy where request should be sent: [facility] [place], [place] - [address] [facility] [phone]  - [phone] FX     Last office visit with prescribing clinician: 5/28/2024   Last telemedicine visit with prescribing clinician: Visit date not found   Next office visit with prescribing clinician: 6/10/2025     Additional details provided by patient:     Does the patient have less than a 3 day supply:  [x] Yes  [] No    Would you like a call back once the refill request has been completed: [] Yes [] No    If the office needs to give you a call back, can they leave a voicemail: [] Yes [] No    April Solomon Amaya Rep   06/26/24 10:18 EDT

## 2025-04-07 DIAGNOSIS — F41.9 ANXIETY: ICD-10-CM

## 2025-04-07 DIAGNOSIS — E78.5 HYPERLIPIDEMIA, UNSPECIFIED HYPERLIPIDEMIA TYPE: ICD-10-CM

## 2025-04-07 DIAGNOSIS — M10.9 GOUT, UNSPECIFIED CAUSE, UNSPECIFIED CHRONICITY, UNSPECIFIED SITE: ICD-10-CM

## 2025-04-07 DIAGNOSIS — I10 HYPERTENSION, BENIGN: ICD-10-CM

## 2025-04-07 RX ORDER — LISINOPRIL 40 MG/1
40 TABLET ORAL DAILY
Qty: 90 TABLET | Refills: 2 | Status: SHIPPED | OUTPATIENT
Start: 2025-04-07

## 2025-04-07 RX ORDER — ATORVASTATIN CALCIUM 40 MG/1
40 TABLET, FILM COATED ORAL DAILY
Qty: 90 TABLET | Refills: 2 | Status: SHIPPED | OUTPATIENT
Start: 2025-04-07

## 2025-04-07 RX ORDER — HYDROCHLOROTHIAZIDE 25 MG/1
25 TABLET ORAL DAILY
Qty: 90 TABLET | Refills: 2 | Status: SHIPPED | OUTPATIENT
Start: 2025-04-07

## 2025-04-07 RX ORDER — FENOFIBRATE 145 MG/1
145 TABLET, COATED ORAL DAILY
Qty: 90 TABLET | Refills: 2 | Status: SHIPPED | OUTPATIENT
Start: 2025-04-07

## 2025-04-07 RX ORDER — METOPROLOL SUCCINATE 100 MG/1
100 TABLET, EXTENDED RELEASE ORAL DAILY
Qty: 90 TABLET | Refills: 2 | Status: SHIPPED | OUTPATIENT
Start: 2025-04-07

## 2025-04-07 RX ORDER — ALLOPURINOL 300 MG/1
300 TABLET ORAL DAILY
Qty: 90 TABLET | Refills: 2 | Status: SHIPPED | OUTPATIENT
Start: 2025-04-07

## 2025-04-16 DIAGNOSIS — F41.9 ANXIETY: ICD-10-CM

## 2025-04-16 DIAGNOSIS — E78.5 HYPERLIPIDEMIA, UNSPECIFIED HYPERLIPIDEMIA TYPE: ICD-10-CM

## 2025-04-16 DIAGNOSIS — I10 HYPERTENSION, BENIGN: ICD-10-CM

## 2025-04-16 DIAGNOSIS — M10.9 GOUT, UNSPECIFIED CAUSE, UNSPECIFIED CHRONICITY, UNSPECIFIED SITE: ICD-10-CM

## 2025-04-16 RX ORDER — METOPROLOL SUCCINATE 100 MG/1
100 TABLET, EXTENDED RELEASE ORAL DAILY
Qty: 90 TABLET | Refills: 2 | Status: SHIPPED | OUTPATIENT
Start: 2025-04-16

## 2025-04-16 RX ORDER — ALLOPURINOL 300 MG/1
300 TABLET ORAL DAILY
Qty: 90 TABLET | Refills: 2 | Status: SHIPPED | OUTPATIENT
Start: 2025-04-16

## 2025-04-16 RX ORDER — ATORVASTATIN CALCIUM 40 MG/1
40 TABLET, FILM COATED ORAL DAILY
Qty: 90 TABLET | Refills: 2 | Status: SHIPPED | OUTPATIENT
Start: 2025-04-16

## 2025-04-16 RX ORDER — FENOFIBRATE 145 MG/1
145 TABLET, COATED ORAL DAILY
Qty: 90 TABLET | Refills: 2 | Status: SHIPPED | OUTPATIENT
Start: 2025-04-16

## 2025-04-16 RX ORDER — LISINOPRIL 40 MG/1
40 TABLET ORAL DAILY
Qty: 90 TABLET | Refills: 2 | Status: SHIPPED | OUTPATIENT
Start: 2025-04-16

## 2025-04-16 RX ORDER — HYDROCHLOROTHIAZIDE 25 MG/1
25 TABLET ORAL DAILY
Qty: 90 TABLET | Refills: 2 | Status: SHIPPED | OUTPATIENT
Start: 2025-04-16

## 2025-06-09 NOTE — PROGRESS NOTES
Bethanie Sam is a 55 y.o. male.     Chief Complaint   Patient presents with    Annual Exam    Hypertension    Hyperlipidemia       The patient is here: to discuss health maintenance and disease prevention to follow up on multiple medical problems.  Last comprehensive physical was on 5/28/2024.  Previous physical was performed by  Reyes Tubbs MD  Overall has: moderate activity with work/home activities, exercises 1 time per week, good appetite, feels well with minor complaints, decreased energy level, and is sleeping well. Nutrition: appropriate balanced diet and balanced diet. Last tetanus shot was unknown.     Hypertension  Chronicity:  Chronic  Onset:  More than 1 year ago  Progression since onset:  Gradually worsening  Condition status:  Uncontrolled  Associated symptoms: anxiety, shortness of breath and sweats    Associated symptoms: no chest pain and no palpitations    Agents associated with hypertension:  NSAIDs  CAD risks:  Dyslipidemia, family history, obesity, sedentary lifestyle and stress  Current therapy:  ACE inhibitors, beta blockers and diuretics  Compliance problems:  No compliance problems  Identifiable causes: sleep apnea    Hyperlipidemia  This is a chronic problem. The current episode started more than 1 year ago. Recent lipid tests were reviewed and are high. Exacerbating diseases include obesity. Associated symptoms include shortness of breath. Pertinent negatives include no chest pain. Current antihyperlipidemic treatment includes statins and herbal therapy. There are no compliance problems.  Risk factors for coronary artery disease include dyslipidemia, hypertension, male sex and obesity.        Recent Hospitalizations:  No hospitalization(s) within the last year..  ccc      I personally reviewed and updated the patient's allergies, medications, problem list, and past medical, surgical, social, and family history. I have reviewed and confirmed the accuracy of the HPI and  ROS as documented by the MA/LPN/RN Reyes Tubbs MD    Family History   Problem Relation Age of Onset    Heart disease Mother     Hypertension Mother     Stroke Mother     Arthritis Mother     Depression Mother     Diabetes Mother         Borderline    Heart disease Father     Cancer Father     Lung cancer Paternal Uncle     Cancer Paternal Uncle     Gout Maternal Grandmother     Depression Maternal Grandmother     Diabetes Maternal Grandmother         Borderline    Heart disease Maternal Grandmother     Hypertension Maternal Grandmother     Miscarriages / Stillbirths Maternal Grandmother     Hearing loss Maternal Grandfather     Hypertension Maternal Grandfather     Heart disease Maternal Grandfather     Cancer Paternal Grandfather     Anxiety disorder Maternal Aunt     Depression Maternal Aunt     Asthma Sister     Depression Sister     Depression Sister     Hypertension Sister     Miscarriages / Stillbirths Sister     Depression Daughter     Depression Daughter        Social History     Tobacco Use    Smoking status: Never     Passive exposure: Past    Smokeless tobacco: Never   Vaping Use    Vaping status: Never Used   Substance Use Topics    Alcohol use: Yes     Comment: New Years and [employer]    Drug use: No       Past Surgical History:   Procedure Laterality Date    COLONOSCOPY  01/15/1996       Patient Active Problem List   Diagnosis    Acute crisis reaction    Chest pain    Gout    Headache    Hemorrhoids    Familial hypercholesterolemia    Malaise and fatigue    Nonorganic sleep disorder    VERONICA (obstructive sleep apnea)    Class 2 severe obesity due to excess calories with serious comorbidity and body mass index (BMI) of 35.0 to 35.9 in adult    Annual visit for general adult medical examination with abnormal findings    Screening for diabetes mellitus    Seasonal allergic rhinitis due to pollen    Abnormal EKG    Essential hypertension    Right wrist pain         Current Outpatient Medications:      allopurinol (ZYLOPRIM) 300 MG tablet, Take 1 tablet by mouth Daily., Disp: 90 tablet, Rfl: 2    aspirin 81 MG EC tablet, Take 1 tablet by mouth Daily., Disp: 90 tablet, Rfl: 3    atorvastatin (LIPITOR) 40 MG tablet, Take 1 tablet by mouth Daily., Disp: 90 tablet, Rfl: 2    Cholecalciferol (VITAMIN D3) 3000 units tablet, Take 1 tablet by mouth., Disp: , Rfl:     coenzyme Q10 100 MG capsule, Take 2 capsules by mouth Daily., Disp: , Rfl:     fenofibrate (TRICOR) 145 MG tablet, Take 1 tablet by mouth Daily., Disp: 90 tablet, Rfl: 2    fluticasone (FLONASE) 50 MCG/ACT nasal spray, 2 sprays into the nostril(s) as directed by provider Daily., Disp: 11.1 mL, Rfl: 3    hydroCHLOROthiazide 25 MG tablet, Take 1 tablet by mouth Daily., Disp: 90 tablet, Rfl: 2    lisinopril (PRINIVIL,ZESTRIL) 40 MG tablet, Take 1 tablet by mouth Daily., Disp: 90 tablet, Rfl: 2    LORATADINE PO, Take 10 mg by mouth Daily., Disp: , Rfl:     metoprolol succinate XL (TOPROL-XL) 100 MG 24 hr tablet, Take 1 tablet by mouth Daily., Disp: 90 tablet, Rfl: 2    NON FORMULARY, TESTboost has hawthorn berry, ginseng root, ashwagandha root, tribulus terrestris fruit, Disp: , Rfl:     NON FORMULARY, Puravive: weight loss supplement, Disp: , Rfl:     NON FORMULARY, B- Healthy supplement Vitamin b12, b2, b6, Disp: , Rfl:     NON FORMULARY, Food enzymes, Disp: , Rfl:     NON FORMULARY, Adrenal support, Disp: , Rfl:     NON FORMULARY, Berberine: glucose control, Disp: , Rfl:     NON FORMULARY, 10 mg. L-Methyl folate, Disp: , Rfl:     sertraline (ZOLOFT) 50 MG tablet, Take 1 tablet by mouth Daily., Disp: 90 tablet, Rfl: 2    Review of Systems   Constitutional:  Negative for chills and diaphoresis.   HENT:  Negative for trouble swallowing and voice change.    Eyes:  Negative for visual disturbance.   Respiratory:  Positive for shortness of breath.    Cardiovascular:  Negative for chest pain and palpitations.   Gastrointestinal:  Negative for abdominal pain and  "nausea.   Endocrine: Negative for polydipsia and polyphagia.   Genitourinary:  Negative for hematuria.   Musculoskeletal:  Negative for neck stiffness.   Skin:  Negative for color change and pallor.   Allergic/Immunologic: Negative for immunocompromised state.   Neurological:  Negative for seizures and syncope.   Hematological:  Negative for adenopathy.   Psychiatric/Behavioral:  Negative for hallucinations, sleep disturbance and suicidal ideas.        I have reviewed and confirmed the accuracy of the ROS as documented by the MA/LPN/RN Reyes Tubbs MD      Objective   /84 (BP Location: Right arm, Patient Position: Sitting, Cuff Size: Adult)   Pulse 88   Temp 97.3 °F (36.3 °C) (Temporal)   Resp 20   Ht 180.3 cm (71\")   Wt 112 kg (246 lb 3.2 oz)   SpO2 98%   BMI 34.34 kg/m²   BP Readings from Last 3 Encounters:   06/10/25 122/84   05/28/24 120/88   02/21/23 132/82     Wt Readings from Last 3 Encounters:   06/10/25 112 kg (246 lb 3.2 oz)   05/28/24 112 kg (247 lb)   02/21/23 112 kg (247 lb 12.8 oz)     Physical Exam  Constitutional:       Appearance: Normal appearance. He is well-developed. He is not diaphoretic.   HENT:      Head: Normocephalic and atraumatic.      Right Ear: Tympanic membrane, ear canal and external ear normal.      Left Ear: Tympanic membrane, ear canal and external ear normal.      Nose: Nose normal.      Mouth/Throat:      Mouth: Mucous membranes are moist.   Eyes:      General: Lids are normal.      Extraocular Movements: Extraocular movements intact.      Conjunctiva/sclera: Conjunctivae normal.      Pupils: Pupils are equal, round, and reactive to light.   Neck:      Thyroid: No thyromegaly.      Vascular: No carotid bruit or JVD.      Trachea: No tracheal deviation.   Cardiovascular:      Rate and Rhythm: Normal rate and regular rhythm.      Heart sounds: Normal heart sounds. No murmur heard.     No friction rub. No gallop.   Pulmonary:      Effort: Pulmonary effort is normal. "      Breath sounds: Normal breath sounds. No stridor. No decreased breath sounds, wheezing or rales.   Abdominal:      General: Bowel sounds are normal. There is no distension.      Palpations: Abdomen is soft. There is no mass.      Tenderness: There is no abdominal tenderness. There is no guarding or rebound.      Hernia: No hernia is present.   Lymphadenopathy:      Head:      Right side of head: No submental, submandibular, tonsillar, preauricular, posterior auricular or occipital adenopathy.      Left side of head: No submental, submandibular, tonsillar, preauricular, posterior auricular or occipital adenopathy.      Cervical: No cervical adenopathy.   Skin:     General: Skin is warm and dry.      Coloration: Skin is not pale.   Neurological:      Mental Status: He is alert and oriented to person, place, and time.      Cranial Nerves: No cranial nerve deficit.      Sensory: No sensory deficit.      Coordination: Coordination normal.      Gait: Gait normal.      Deep Tendon Reflexes: Reflexes are normal and symmetric.         Data / Lab Results:    Hemoglobin A1C   Date Value Ref Range Status   02/08/2022 5.5 % Final     Lab Results   Component Value Date    Glucose 105 (H) 05/28/2024    Glucose 103 02/08/2022    Glucose, UA Negative 06/10/2025     Lab Results   Component Value Date     (H) 05/28/2024     (H) 02/21/2023     (H) 02/10/2022     Lab Results   Component Value Date    CHOL 211 (H) 02/12/2018    CHOL 177 01/03/2018    CHOL 246 (H) 03/22/2017     Lab Results   Component Value Date    TRIG 155 (H) 05/28/2024    TRIG 335 (H) 02/21/2023    TRIG 308 (H) 02/10/2022     Lab Results   Component Value Date    HDL 31 (L) 05/28/2024    HDL 26 (L) 02/21/2023    HDL 26 (L) 02/10/2022     Lab Results   Component Value Date    PSA 1.8 05/28/2024    PSA 2.3 02/21/2023    PSA 1.9 02/10/2022     Lab Results   Component Value Date    WBC 9.0 05/28/2024    HGB 17.0 05/28/2024    HCT 51.8 (H)  "05/28/2024    MCV 91 05/28/2024     05/28/2024     Lab Results   Component Value Date    TSH 1.760 05/28/2024     Lab Results   Component Value Date    GLUCOSE 105 (H) 05/28/2024    BUN 24 05/28/2024    CREATININE 1.05 05/28/2024    EGFRIFNONA 64 02/10/2022    EGFRIFAFRI 74 02/10/2022    BCR 23 (H) 05/28/2024    K 3.8 05/28/2024    CO2 24 05/28/2024    CALCIUM 10.4 (H) 05/28/2024    ALBUMIN 4.6 05/28/2024    AST 41 (H) 05/28/2024    ALT 39 05/28/2024     Lab Results   Component Value Date    BERNICE  02/13/2018     NEGATIVE This result is designed to aid in the diagnosis of many of the    BERNICE  02/13/2018     systemic autoimmune disorders and is not diagnostic by itself.  Test results    BERNICE  02/13/2018     should be interpreted in conjunction with the clinical evaluation of the    BERNICE  02/13/2018     patient.  SLE patients undergoing steroid therapy may have negative results.      Lab Results   Component Value Date    CRP 18.99 (H) 02/16/2018      No results found for: \"IRON\", \"TIBC\", \"FERRITIN\"   No results found for: \"[id number]\"     Age-appropriate Screening Schedule:  Refer to the list below for future screening recommendations based on patient's age, sex and/or medical conditions. Orders for these recommended tests are listed in the plan section. The patient has been provided with a written plan.    Health Maintenance   Topic Date Due    TDAP/TD VACCINES (1 - Tdap) Never done    HEPATITIS C SCREENING  Never done    Pneumococcal Vaccine 50+ (1 of 1 - PCV) Never done    ZOSTER VACCINE (1 of 2) Never done    PROSTATE CANCER SCREENING  05/28/2025    LIPID PANEL  05/28/2025    COVID-19 Vaccine (1 - 2024-25 season) 12/10/2025 (Originally 9/1/2024)    INFLUENZA VACCINE  07/01/2025    ANNUAL PHYSICAL  06/10/2026    COLORECTAL CANCER SCREENING  06/13/2027           Assessment & Plan      Medications        Problem List         LOS    Physical.  Doing well.  Recommend update tetanus vaccine through [facility].  Flu " vaccine, pneumonia, Covid vaccine declined.    Discussed coated baby aspirin daily.  Discussed health maintenance, screening test, lifestyle modification.  Hypertension.      Much improved today on Rx.  Discussed low-sodium diet.  EKG with Q waves in inferior leads, no acute ST/T wave changes..   Has had cardiology eval per Dr. Jason, stress echocardiogram benign 2021. History of benign cardiac stress testing 2012.  Coronary artery screening CT scheduled.  Family history CVA, his mother.  Vascular screening bundle scheduled.  Hyperlipidemia.  Much improved on atorvastatin, TriCor.  Restart fish oil. History of triglycerides greater than 1000 history of episode of triglyceride induced pancreatitis requiring hospitalization..  Discussed diet, exercise, lifestyle modification.  Follow-up recheck  VERONICA.  Clinically improved today, tolerating CPAP..  Gout.  Improved on allopurinol.  Follow-up recheck uric acid.  Depression.    Improved on sertraline.  Fatty liver.  Stable, mild elevation LFTs, continue fish oil, continue to monitor.  Elevated fasting blood sugar.      Stable today, home blood sugar monitor results averaging 120s, FBS here 103, A1c 5.5.  History of 117/borderline.  Discussed diet, exercise, lifestyle modification.    Recheck fasting labs.  Prostate cancer screening.  PSA has been normal/recheck.  Colon cancer screening.  Cologuard neg 2024.    Seborrheic keratosis.  Multiple old face, benign appearance.  Topical care discussed.  Consider freezing if worsening symptoms.  Discussed sun protection.  Follow-up recheck.  Call return if worsening symptoms.        Previous Exams:  PSA was 1.8 on 5/28/2024               Previous PSA was 2.3 on 2/21/2023.  EKG was on 7/8/2022 ordered by Dr Jason.    EKG showed normal sinus rhythm.     Inferior small Q waves were noted.  Echocardiogram was on 6/5/2021 ordered by Dr Jason.    Left ventricular systolic function is normal.   Left ventricular ejection fraction is 55 to  60%   Left ventricular wall thickness is consistent with mild concentric hypertrophy.   Left ventricular diastolic function is consistent with (grade I) impaired relaxation.   This is normal treadmill exercise stress test with no electrocardiographic or echocardiographic evidence of ischemia    Diagnoses and all orders for this visit:    1. Annual visit for general adult medical examination with abnormal findings (Primary)  -     POCT urinalysis dipstick, manual    2. Essential hypertension    3. Hyperlipidemia, unspecified hyperlipidemia type    4. Class 2 severe obesity due to excess calories with serious comorbidity and body mass index (BMI) of 35.0 to 35.9 in adult    5. Screening for malignant neoplasm of colon    6. Screening for malignant neoplasm of prostate    7. Gout, unspecified cause, unspecified chronicity, unspecified site              Expected course, medications, and adverse effects discussed.  Call or return if worsening or persistent symptoms.  I wore protective equipment throughout this patient encounter including a mask, gloves, and eye protection.  Hand hygiene was performed before donning protective equipment and after removal when leaving the room. The complete contents of the Assessment and Plan and Data / Lab Results as documented above have been reviewed and addressed by myself with the patient today as part of an ongoing evaluation / treatment plan.  If some of the documentation has been copied from a previous note and is unchanged it indicates that this problem / plan has been assessed today but is stable from a previous visit and no changes have been recommended.  The separate E/M service provided today is significant, medically necessary, and separately identifiable.

## 2025-06-10 ENCOUNTER — OFFICE VISIT (OUTPATIENT)
Dept: FAMILY MEDICINE CLINIC | Facility: CLINIC | Age: 55
End: 2025-06-10
Payer: COMMERCIAL

## 2025-06-10 VITALS
WEIGHT: 246.2 LBS | DIASTOLIC BLOOD PRESSURE: 84 MMHG | BODY MASS INDEX: 34.47 KG/M2 | OXYGEN SATURATION: 98 % | HEIGHT: 71 IN | HEART RATE: 88 BPM | TEMPERATURE: 97.3 F | SYSTOLIC BLOOD PRESSURE: 122 MMHG | RESPIRATION RATE: 20 BRPM

## 2025-06-10 DIAGNOSIS — R53.83 OTHER FATIGUE: ICD-10-CM

## 2025-06-10 DIAGNOSIS — E66.01 CLASS 2 SEVERE OBESITY DUE TO EXCESS CALORIES WITH SERIOUS COMORBIDITY AND BODY MASS INDEX (BMI) OF 35.0 TO 35.9 IN ADULT: ICD-10-CM

## 2025-06-10 DIAGNOSIS — I10 ESSENTIAL HYPERTENSION: ICD-10-CM

## 2025-06-10 DIAGNOSIS — Z00.01 ANNUAL VISIT FOR GENERAL ADULT MEDICAL EXAMINATION WITH ABNORMAL FINDINGS: Primary | ICD-10-CM

## 2025-06-10 DIAGNOSIS — Z12.11 SCREENING FOR MALIGNANT NEOPLASM OF COLON: ICD-10-CM

## 2025-06-10 DIAGNOSIS — E66.812 CLASS 2 SEVERE OBESITY DUE TO EXCESS CALORIES WITH SERIOUS COMORBIDITY AND BODY MASS INDEX (BMI) OF 35.0 TO 35.9 IN ADULT: ICD-10-CM

## 2025-06-10 DIAGNOSIS — Z12.5 SCREENING FOR MALIGNANT NEOPLASM OF PROSTATE: ICD-10-CM

## 2025-06-10 DIAGNOSIS — Z13.6 SCREENING FOR CARDIOVASCULAR CONDITION: ICD-10-CM

## 2025-06-10 DIAGNOSIS — R73.9 ELEVATED BLOOD SUGAR: ICD-10-CM

## 2025-06-10 DIAGNOSIS — E78.5 HYPERLIPIDEMIA, UNSPECIFIED HYPERLIPIDEMIA TYPE: ICD-10-CM

## 2025-06-10 DIAGNOSIS — M10.9 GOUT, UNSPECIFIED CAUSE, UNSPECIFIED CHRONICITY, UNSPECIFIED SITE: ICD-10-CM

## 2025-06-10 LAB
BILIRUB BLD-MCNC: NEGATIVE MG/DL
CLARITY, POC: ABNORMAL
COLOR UR: ABNORMAL
EXPIRATION DATE: NORMAL
GLUCOSE UR STRIP-MCNC: NEGATIVE MG/DL
HBA1C MFR BLD: 5.5 % (ref 4.5–5.7)
KETONES UR QL: NEGATIVE
LEUKOCYTE EST, POC: NEGATIVE
Lab: NORMAL
NITRITE UR-MCNC: NEGATIVE MG/ML
PH UR: 5 [PH] (ref 5–8)
PROT UR STRIP-MCNC: ABNORMAL MG/DL
RBC # UR STRIP: ABNORMAL /UL
SP GR UR: 1.02 (ref 1–1.03)
UROBILINOGEN UR QL: NORMAL

## 2025-06-11 LAB
ALBUMIN SERPL-MCNC: 4.8 G/DL (ref 3.8–4.9)
ALP SERPL-CCNC: 41 IU/L (ref 44–121)
ALT SERPL-CCNC: 41 IU/L (ref 0–44)
AST SERPL-CCNC: 44 IU/L (ref 0–40)
BASOPHILS # BLD AUTO: 0.1 X10E3/UL (ref 0–0.2)
BASOPHILS NFR BLD AUTO: 1 %
BILIRUB SERPL-MCNC: 0.7 MG/DL (ref 0–1.2)
BUN SERPL-MCNC: 22 MG/DL (ref 6–24)
BUN/CREAT SERPL: 23 (ref 9–20)
CALCIUM SERPL-MCNC: 10.1 MG/DL (ref 8.7–10.2)
CHLORIDE SERPL-SCNC: 100 MMOL/L (ref 96–106)
CHOLEST SERPL-MCNC: 180 MG/DL (ref 100–199)
CHOLEST/HDLC SERPL: 6.4 RATIO (ref 0–5)
CO2 SERPL-SCNC: 21 MMOL/L (ref 20–29)
CREAT SERPL-MCNC: 0.96 MG/DL (ref 0.76–1.27)
EGFRCR SERPLBLD CKD-EPI 2021: 93 ML/MIN/1.73
EOSINOPHIL # BLD AUTO: 0.2 X10E3/UL (ref 0–0.4)
EOSINOPHIL NFR BLD AUTO: 2 %
ERYTHROCYTE [DISTWIDTH] IN BLOOD BY AUTOMATED COUNT: 12.9 % (ref 11.6–15.4)
GLOBULIN SER CALC-MCNC: 2.9 G/DL (ref 1.5–4.5)
GLUCOSE SERPL-MCNC: 94 MG/DL (ref 70–99)
HCT VFR BLD AUTO: 51.9 % (ref 37.5–51)
HDLC SERPL-MCNC: 28 MG/DL
HGB BLD-MCNC: 17.4 G/DL (ref 13–17.7)
IMM GRANULOCYTES # BLD AUTO: 0 X10E3/UL (ref 0–0.1)
IMM GRANULOCYTES NFR BLD AUTO: 0 %
LDLC SERPL CALC-MCNC: 111 MG/DL (ref 0–99)
LYMPHOCYTES # BLD AUTO: 3.3 X10E3/UL (ref 0.7–3.1)
LYMPHOCYTES NFR BLD AUTO: 37 %
MCH RBC QN AUTO: 30.2 PG (ref 26.6–33)
MCHC RBC AUTO-ENTMCNC: 33.5 G/DL (ref 31.5–35.7)
MCV RBC AUTO: 90 FL (ref 79–97)
MONOCYTES # BLD AUTO: 0.5 X10E3/UL (ref 0.1–0.9)
MONOCYTES NFR BLD AUTO: 6 %
NEUTROPHILS # BLD AUTO: 4.7 X10E3/UL (ref 1.4–7)
NEUTROPHILS NFR BLD AUTO: 54 %
PLATELET # BLD AUTO: 221 X10E3/UL (ref 150–450)
POTASSIUM SERPL-SCNC: 3.6 MMOL/L (ref 3.5–5.2)
PROT SERPL-MCNC: 7.7 G/DL (ref 6–8.5)
PSA SERPL-MCNC: 1.6 NG/ML (ref 0–4)
RBC # BLD AUTO: 5.76 X10E6/UL (ref 4.14–5.8)
SODIUM SERPL-SCNC: 140 MMOL/L (ref 134–144)
TRIGL SERPL-MCNC: 236 MG/DL (ref 0–149)
TSH SERPL DL<=0.005 MIU/L-ACNC: 2.27 UIU/ML (ref 0.45–4.5)
URATE SERPL-MCNC: 6.3 MG/DL (ref 3.8–8.4)
VLDLC SERPL CALC-MCNC: 41 MG/DL (ref 5–40)
WBC # BLD AUTO: 8.8 X10E3/UL (ref 3.4–10.8)

## 2025-06-15 LAB
TESTOST FREE SERPL-MCNC: 8.9 PG/ML (ref 7.2–24)
TESTOST SERPL-MCNC: 344.7 NG/DL (ref 264–916)

## 2025-06-25 ENCOUNTER — PATIENT MESSAGE (OUTPATIENT)
Dept: FAMILY MEDICINE CLINIC | Facility: CLINIC | Age: 55
End: 2025-06-25
Payer: COMMERCIAL

## 2025-06-30 ENCOUNTER — TELEPHONE (OUTPATIENT)
Dept: FAMILY MEDICINE CLINIC | Facility: CLINIC | Age: 55
End: 2025-06-30
Payer: COMMERCIAL

## 2025-06-30 DIAGNOSIS — R53.83 MALAISE AND FATIGUE: ICD-10-CM

## 2025-06-30 DIAGNOSIS — R53.83 OTHER FATIGUE: Primary | ICD-10-CM

## 2025-06-30 DIAGNOSIS — R53.81 MALAISE AND FATIGUE: ICD-10-CM

## 2025-06-30 DIAGNOSIS — E55.9 VITAMIN D DEFICIENCY: ICD-10-CM

## 2025-06-30 DIAGNOSIS — E53.8 VITAMIN B12 DEFICIENCY: ICD-10-CM

## 2025-06-30 NOTE — TELEPHONE ENCOUNTER
Patient had a 5 panel plus Testosterone level checked on 6/10/2025. He is asking for the results but also asking if Micronutrient Test can be ordered. I asked patient for clarification and he mentioned Vitamin B and D, as he is losing energy and can take a 2 hour nap, just something for explanation of his symptoms

## 2025-07-01 ENCOUNTER — RESULTS FOLLOW-UP (OUTPATIENT)
Dept: FAMILY MEDICINE CLINIC | Facility: CLINIC | Age: 55
End: 2025-07-01
Payer: COMMERCIAL

## 2025-07-01 NOTE — TELEPHONE ENCOUNTER
That is okay to order a B12 folate and vitamin D level, just let him know he will have to have additional blood drawn, thanks

## 2025-07-08 ENCOUNTER — RESULTS FOLLOW-UP (OUTPATIENT)
Dept: FAMILY MEDICINE CLINIC | Facility: CLINIC | Age: 55
End: 2025-07-08
Payer: COMMERCIAL

## 2025-07-08 LAB
25(OH)D3+25(OH)D2 SERPL-MCNC: 34.1 NG/ML (ref 30–100)
FOLATE SERPL-MCNC: >20 NG/ML
VIT B12 SERPL-MCNC: 339 PG/ML (ref 232–1245)

## 2025-07-23 ENCOUNTER — HOSPITAL ENCOUNTER (OUTPATIENT)
Dept: ULTRASOUND IMAGING | Facility: HOSPITAL | Age: 55
Discharge: HOME | End: 2025-07-23
Payer: COMMERCIAL

## 2025-07-23 ENCOUNTER — HOSPITAL ENCOUNTER (OUTPATIENT)
Dept: CT IMAGING | Facility: HOSPITAL | Age: 55
Discharge: HOME | End: 2025-07-23
Payer: COMMERCIAL

## 2025-07-23 DIAGNOSIS — Z13.6 SCREENING FOR CARDIOVASCULAR CONDITION: ICD-10-CM

## 2025-07-23 PROCEDURE — VASCULARSCN2 VASCULAR SCREENING (BUNDLE) CAR: Performed by: INTERNAL MEDICINE

## 2025-07-23 PROCEDURE — 93799 UNLISTED CV SVC/PROCEDURE: CPT

## 2025-07-23 PROCEDURE — 75571 CT HRT W/O DYE W/CA TEST: CPT

## 2025-07-25 LAB
BH CV VAS SCREENING CAROTID CCA LEFT: 71.6 CM/SEC
BH CV VAS SCREENING CAROTID CCA RIGHT: 72.5 CM/SEC
BH CV VAS SCREENING CAROTID ICA LEFT: 46.3 CM/SEC
BH CV VAS SCREENING CAROTID ICA RIGHT: 47.7 CM/SEC
BH CV XLRA MEAS - MID AO DIAM: 1.81 CM
BH CV XLRA MEAS - PAD LEFT ABI PT: 1.18
BH CV XLRA MEAS - PAD LEFT ARM: 140 MMHG
BH CV XLRA MEAS - PAD LEFT LEG PT: 172 MMHG
BH CV XLRA MEAS - PAD RIGHT ABI PT: 1.14
BH CV XLRA MEAS - PAD RIGHT ARM: 146 MMHG
BH CV XLRA MEAS - PAD RIGHT LEG PT: 167 MMHG
BH CV XLRA MEAS LEFT DIST CCA EDV: 15.6 CM/SEC
BH CV XLRA MEAS LEFT DIST CCA PSV: 71.6 CM/SEC
BH CV XLRA MEAS LEFT ICA/CCA RATIO: 0.65
BH CV XLRA MEAS LEFT PROX ICA EDV: 15.1 CM/SEC
BH CV XLRA MEAS LEFT PROX ICA PSV: 46.3 CM/SEC
BH CV XLRA MEAS RIGHT DIST CCA EDV: 16.3 CM/SEC
BH CV XLRA MEAS RIGHT DIST CCA PSV: 72.5 CM/SEC
BH CV XLRA MEAS RIGHT ICA/CCA RATIO: 0.66
BH CV XLRA MEAS RIGHT PROX ICA EDV: 14.1 CM/SEC
BH CV XLRA MEAS RIGHT PROX ICA PSV: 47.7 CM/SEC

## 2025-07-30 ENCOUNTER — TELEPHONE (OUTPATIENT)
Dept: FAMILY MEDICINE CLINIC | Facility: CLINIC | Age: 55
End: 2025-07-30
Payer: COMMERCIAL

## 2025-07-30 NOTE — TELEPHONE ENCOUNTER
"Patient was given results from CT Cardiac Calcium Score and Vascular Bundle. Patient is asking about the lung nodule found on the CT Cardiac Calcium exam. Please advise    You also recommended a Stress Test due to elevated Calcium score. Patient is asking \"Should we consider getting a coronary CTA instead of a stress test?\"  "

## 2025-07-30 NOTE — TELEPHONE ENCOUNTER
Let him know, yes his CT did show a small nodule in his lung, he is low risk as he has never been a smoker, would recommend repeating a CT scan in 1 year to reevaluate this and make sure that is not growing or changing, would recommend proceeding with a stress test, this is really the best test to indicate if the blockage is significant as it is done when he is exercising so we will see how his heart functions, if he is okay with that get him set up for treadmill echocardiogram, other option would be to go ahead and see cardiology to discuss testing options/next steps, if he is interested in this could see Dr. Jason to get a plan together for him, thanks

## 2025-08-05 ENCOUNTER — TELEPHONE (OUTPATIENT)
Dept: FAMILY MEDICINE CLINIC | Facility: CLINIC | Age: 55
End: 2025-08-05
Payer: COMMERCIAL

## 2025-08-13 ENCOUNTER — TELEPHONE (OUTPATIENT)
Dept: FAMILY MEDICINE CLINIC | Facility: CLINIC | Age: 55
End: 2025-08-13
Payer: COMMERCIAL

## 2025-08-13 DIAGNOSIS — J01.81 OTHER ACUTE RECURRENT SINUSITIS: Primary | ICD-10-CM

## 2025-08-14 ENCOUNTER — TELEPHONE (OUTPATIENT)
Dept: FAMILY MEDICINE CLINIC | Facility: CLINIC | Age: 55
End: 2025-08-14
Payer: COMMERCIAL

## 2025-08-14 RX ORDER — AZITHROMYCIN 250 MG/1
TABLET, FILM COATED ORAL
Qty: 6 TABLET | Refills: 0 | Status: SHIPPED | OUTPATIENT
Start: 2025-08-14

## 2025-08-18 ENCOUNTER — OFFICE VISIT (OUTPATIENT)
Dept: FAMILY MEDICINE CLINIC | Facility: CLINIC | Age: 55
End: 2025-08-18
Payer: COMMERCIAL

## 2025-08-18 VITALS
BODY MASS INDEX: 35.53 KG/M2 | OXYGEN SATURATION: 96 % | WEIGHT: 253.8 LBS | RESPIRATION RATE: 18 BRPM | DIASTOLIC BLOOD PRESSURE: 86 MMHG | TEMPERATURE: 98 F | SYSTOLIC BLOOD PRESSURE: 138 MMHG | HEIGHT: 71 IN | HEART RATE: 88 BPM

## 2025-08-18 DIAGNOSIS — J30.1 SEASONAL ALLERGIC RHINITIS DUE TO POLLEN: ICD-10-CM

## 2025-08-18 DIAGNOSIS — J01.00 ACUTE NON-RECURRENT MAXILLARY SINUSITIS: Primary | ICD-10-CM

## 2025-08-18 DIAGNOSIS — I10 ESSENTIAL HYPERTENSION: ICD-10-CM

## 2025-08-18 PROCEDURE — 99213 OFFICE O/P EST LOW 20 MIN: CPT | Performed by: FAMILY MEDICINE

## 2025-08-18 RX ORDER — PREDNISONE 10 MG/1
10 TABLET ORAL TAKE AS DIRECTED
Qty: 16 TABLET | Refills: 0 | Status: SHIPPED | OUTPATIENT
Start: 2025-08-18 | End: 2025-08-28

## 2025-08-18 RX ORDER — MONTELUKAST SODIUM 10 MG/1
10 TABLET ORAL NIGHTLY
Qty: 90 TABLET | Refills: 3 | Status: SHIPPED | OUTPATIENT
Start: 2025-08-18